# Patient Record
Sex: FEMALE | Race: WHITE | URBAN - METROPOLITAN AREA
[De-identification: names, ages, dates, MRNs, and addresses within clinical notes are randomized per-mention and may not be internally consistent; named-entity substitution may affect disease eponyms.]

---

## 2017-06-23 ENCOUNTER — TELEPHONE (OUTPATIENT)
Dept: BARIATRICS/WEIGHT MGMT | Age: 57
End: 2017-06-23

## 2017-07-14 ENCOUNTER — OFFICE VISIT (OUTPATIENT)
Dept: BARIATRICS/WEIGHT MGMT | Age: 57
End: 2017-07-14
Payer: MEDICARE

## 2017-07-14 VITALS
WEIGHT: 281 LBS | SYSTOLIC BLOOD PRESSURE: 117 MMHG | BODY MASS INDEX: 42.59 KG/M2 | HEIGHT: 68 IN | DIASTOLIC BLOOD PRESSURE: 70 MMHG | HEART RATE: 64 BPM

## 2017-07-14 DIAGNOSIS — I10 ESSENTIAL HYPERTENSION: Primary | ICD-10-CM

## 2017-07-14 DIAGNOSIS — I47.1 SVT (SUPRAVENTRICULAR TACHYCARDIA) (HCC): ICD-10-CM

## 2017-07-14 PROBLEM — I47.10 SVT (SUPRAVENTRICULAR TACHYCARDIA) (HCC): Status: ACTIVE | Noted: 2017-07-14

## 2017-07-14 PROCEDURE — 3017F COLORECTAL CA SCREEN DOC REV: CPT | Performed by: SURGERY

## 2017-07-14 PROCEDURE — G8419 CALC BMI OUT NRM PARAM NOF/U: HCPCS | Performed by: SURGERY

## 2017-07-14 PROCEDURE — 99204 OFFICE O/P NEW MOD 45 MIN: CPT | Performed by: SURGERY

## 2017-07-14 PROCEDURE — G8427 DOCREV CUR MEDS BY ELIG CLIN: HCPCS | Performed by: SURGERY

## 2017-07-14 PROCEDURE — 1036F TOBACCO NON-USER: CPT | Performed by: SURGERY

## 2017-07-14 PROCEDURE — 3014F SCREEN MAMMO DOC REV: CPT | Performed by: SURGERY

## 2017-07-14 RX ORDER — ZOLPIDEM TARTRATE 10 MG/1
TABLET ORAL NIGHTLY PRN
COMMUNITY

## 2017-11-13 ENCOUNTER — TELEPHONE (OUTPATIENT)
Dept: BARIATRICS/WEIGHT MGMT | Age: 57
End: 2017-11-13

## 2018-01-19 NOTE — TELEPHONE ENCOUNTER
Left VMM for pt to schedule RD visit is moving forward with surgery; message also left on 12/22/17 without response; as this is our third attempt to connect with patient without response will cancel orders and notify referring provider

## 2024-05-23 ENCOUNTER — HOSPITAL ENCOUNTER (INPATIENT)
Age: 64
LOS: 5 days | Discharge: HOME | DRG: 304 | End: 2024-05-28
Attending: INTERNAL MEDICINE | Admitting: INTERNAL MEDICINE
Payer: MEDICARE

## 2024-05-23 DIAGNOSIS — R79.89 ELEVATED SERUM CREATININE: ICD-10-CM

## 2024-05-23 DIAGNOSIS — R93.89 ABNORMAL MAGNETIC RESONANCE ANGIOGRAPHY (MRA) OF NECK: Primary | ICD-10-CM

## 2024-05-23 PROBLEM — I16.1 HYPERTENSIVE EMERGENCY: Status: ACTIVE | Noted: 2024-05-23

## 2024-05-23 LAB
CREAT SERPL-MCNC: 1.5 MG/DL (ref 0.5–0.9)
GFR, ESTIMATED: 39 ML/MIN/1.73M2

## 2024-05-23 PROCEDURE — 82565 ASSAY OF CREATININE: CPT

## 2024-05-23 PROCEDURE — 6360000002 HC RX W HCPCS: Performed by: INTERNAL MEDICINE

## 2024-05-23 PROCEDURE — 6370000000 HC RX 637 (ALT 250 FOR IP): Performed by: NURSE PRACTITIONER

## 2024-05-23 PROCEDURE — 6360000002 HC RX W HCPCS: Performed by: NURSE PRACTITIONER

## 2024-05-23 PROCEDURE — 2580000003 HC RX 258: Performed by: INTERNAL MEDICINE

## 2024-05-23 PROCEDURE — 36415 COLL VENOUS BLD VENIPUNCTURE: CPT

## 2024-05-23 PROCEDURE — 2000000000 HC ICU R&B

## 2024-05-23 PROCEDURE — 6370000000 HC RX 637 (ALT 250 FOR IP): Performed by: INTERNAL MEDICINE

## 2024-05-23 PROCEDURE — 99223 1ST HOSP IP/OBS HIGH 75: CPT | Performed by: INTERNAL MEDICINE

## 2024-05-23 RX ORDER — LAMOTRIGINE 100 MG/1
200 TABLET, EXTENDED RELEASE ORAL DAILY
Status: DISCONTINUED | OUTPATIENT
Start: 2024-05-23 | End: 2024-05-28 | Stop reason: HOSPADM

## 2024-05-23 RX ORDER — ONDANSETRON 4 MG/1
4 TABLET, ORALLY DISINTEGRATING ORAL EVERY 8 HOURS PRN
Status: DISCONTINUED | OUTPATIENT
Start: 2024-05-23 | End: 2024-05-28 | Stop reason: HOSPADM

## 2024-05-23 RX ORDER — AMLODIPINE BESYLATE 5 MG/1
5 TABLET ORAL DAILY
Status: DISCONTINUED | OUTPATIENT
Start: 2024-05-23 | End: 2024-05-25

## 2024-05-23 RX ORDER — ENOXAPARIN SODIUM 100 MG/ML
30 INJECTION SUBCUTANEOUS 2 TIMES DAILY
Status: DISCONTINUED | OUTPATIENT
Start: 2024-05-23 | End: 2024-05-28 | Stop reason: HOSPADM

## 2024-05-23 RX ORDER — POTASSIUM CHLORIDE 7.45 MG/ML
10 INJECTION INTRAVENOUS PRN
Status: DISCONTINUED | OUTPATIENT
Start: 2024-05-23 | End: 2024-05-28 | Stop reason: HOSPADM

## 2024-05-23 RX ORDER — ACETAMINOPHEN 650 MG/1
650 SUPPOSITORY RECTAL EVERY 6 HOURS PRN
Status: DISCONTINUED | OUTPATIENT
Start: 2024-05-23 | End: 2024-05-24

## 2024-05-23 RX ORDER — KETOROLAC TROMETHAMINE 30 MG/ML
15 INJECTION, SOLUTION INTRAMUSCULAR; INTRAVENOUS ONCE
Status: COMPLETED | OUTPATIENT
Start: 2024-05-23 | End: 2024-05-23

## 2024-05-23 RX ORDER — POLYETHYLENE GLYCOL 3350 17 G/17G
17 POWDER, FOR SOLUTION ORAL DAILY PRN
Status: DISCONTINUED | OUTPATIENT
Start: 2024-05-23 | End: 2024-05-28 | Stop reason: HOSPADM

## 2024-05-23 RX ORDER — SODIUM CHLORIDE 9 MG/ML
INJECTION, SOLUTION INTRAVENOUS PRN
Status: DISCONTINUED | OUTPATIENT
Start: 2024-05-23 | End: 2024-05-28 | Stop reason: HOSPADM

## 2024-05-23 RX ORDER — MAGNESIUM SULFATE HEPTAHYDRATE 40 MG/ML
2000 INJECTION, SOLUTION INTRAVENOUS PRN
Status: DISCONTINUED | OUTPATIENT
Start: 2024-05-23 | End: 2024-05-28 | Stop reason: HOSPADM

## 2024-05-23 RX ORDER — SODIUM CHLORIDE 0.9 % (FLUSH) 0.9 %
5-40 SYRINGE (ML) INJECTION EVERY 12 HOURS SCHEDULED
Status: DISCONTINUED | OUTPATIENT
Start: 2024-05-23 | End: 2024-05-28 | Stop reason: HOSPADM

## 2024-05-23 RX ORDER — TRAZODONE HYDROCHLORIDE 100 MG/1
100 TABLET ORAL NIGHTLY
Status: DISCONTINUED | OUTPATIENT
Start: 2024-05-23 | End: 2024-05-28 | Stop reason: HOSPADM

## 2024-05-23 RX ORDER — POTASSIUM CHLORIDE 20 MEQ/1
40 TABLET, EXTENDED RELEASE ORAL PRN
Status: DISCONTINUED | OUTPATIENT
Start: 2024-05-23 | End: 2024-05-28 | Stop reason: HOSPADM

## 2024-05-23 RX ORDER — HYDRALAZINE HYDROCHLORIDE 20 MG/ML
10 INJECTION INTRAMUSCULAR; INTRAVENOUS EVERY 6 HOURS PRN
Status: DISCONTINUED | OUTPATIENT
Start: 2024-05-23 | End: 2024-05-24

## 2024-05-23 RX ORDER — ACETAZOLAMIDE 250 MG/1
250 TABLET ORAL DAILY
Status: DISCONTINUED | OUTPATIENT
Start: 2024-05-23 | End: 2024-05-25

## 2024-05-23 RX ORDER — ACETAMINOPHEN 325 MG/1
650 TABLET ORAL EVERY 6 HOURS PRN
Status: DISCONTINUED | OUTPATIENT
Start: 2024-05-23 | End: 2024-05-24

## 2024-05-23 RX ORDER — ONDANSETRON 2 MG/ML
4 INJECTION INTRAMUSCULAR; INTRAVENOUS EVERY 6 HOURS PRN
Status: DISCONTINUED | OUTPATIENT
Start: 2024-05-23 | End: 2024-05-28 | Stop reason: HOSPADM

## 2024-05-23 RX ORDER — METOPROLOL SUCCINATE 100 MG/1
200 TABLET, EXTENDED RELEASE ORAL DAILY
Status: DISCONTINUED | OUTPATIENT
Start: 2024-05-23 | End: 2024-05-28 | Stop reason: HOSPADM

## 2024-05-23 RX ORDER — SODIUM CHLORIDE 0.9 % (FLUSH) 0.9 %
5-40 SYRINGE (ML) INJECTION PRN
Status: DISCONTINUED | OUTPATIENT
Start: 2024-05-23 | End: 2024-05-28 | Stop reason: HOSPADM

## 2024-05-23 RX ADMIN — ACETAZOLAMIDE 250 MG: 250 TABLET ORAL at 20:49

## 2024-05-23 RX ADMIN — AMLODIPINE BESYLATE 5 MG: 5 TABLET ORAL at 20:09

## 2024-05-23 RX ADMIN — TRAZODONE HYDROCHLORIDE 100 MG: 100 TABLET ORAL at 23:32

## 2024-05-23 RX ADMIN — HYDRALAZINE HYDROCHLORIDE 10 MG: 20 INJECTION, SOLUTION INTRAMUSCULAR; INTRAVENOUS at 23:33

## 2024-05-23 RX ADMIN — SODIUM CHLORIDE, PRESERVATIVE FREE 10 ML: 5 INJECTION INTRAVENOUS at 23:35

## 2024-05-23 RX ADMIN — ACETAMINOPHEN 650 MG: 325 TABLET ORAL at 20:05

## 2024-05-23 RX ADMIN — METOPROLOL SUCCINATE 200 MG: 50 TABLET, EXTENDED RELEASE ORAL at 18:45

## 2024-05-23 RX ADMIN — KETOROLAC TROMETHAMINE 15 MG: 30 INJECTION, SOLUTION INTRAMUSCULAR at 23:32

## 2024-05-23 RX ADMIN — ENOXAPARIN SODIUM 30 MG: 100 INJECTION SUBCUTANEOUS at 21:51

## 2024-05-23 RX ADMIN — LAMOTRIGINE 200 MG: 100 TABLET, EXTENDED RELEASE ORAL at 20:06

## 2024-05-23 ASSESSMENT — PAIN DESCRIPTION - DESCRIPTORS
DESCRIPTORS: ACHING
DESCRIPTORS: ACHING

## 2024-05-23 ASSESSMENT — PAIN DESCRIPTION - LOCATION
LOCATION: HEAD

## 2024-05-23 ASSESSMENT — PAIN SCALES - GENERAL
PAINLEVEL_OUTOF10: 7
PAINLEVEL_OUTOF10: 10

## 2024-05-23 NOTE — H&P
[facility]   IN-PATIENT SERVICE   [facility]    HISTORY AND PHYSICAL EXAMINATION            Date:   5/23/2024  Patient name:  Amanda Estrada  Date of admission:  5/23/2024  6:09 PM  MRN:   [id number]  Account:  [id number]  YOB: 1960  PCP:    Albin Brandt MD  Room:   2008/2008-01  Code Status:    Full Code    Chief Complaint:     No chief complaint on file.      History Obtained From:     patient    History of Present Illness:     The patient is a 63 y.o.  Non- / non  female who presents with No chief complaint on file.   and she is admitted to the hospital for the management of    Patient is a 63-year-old female with past medical history of hypertension, SVT presented to the ER at [facility] with headache, patient states that since last 2 days she is having severe headache,  In the ER patient had head CT showed possible idiopathic intracranial hypertension and patient and MRI brain, showed she had a Chiari malformation no ventriculomegaly and chronic white matter changes.  He was also found to be in hypertensive urgency, started on Cardene drip in the ER at [facility] and was sent to [facility] for further management.  Patient had lab work consistent with creatinine of 1.6, patient denies any history of CKD,  Hemoglobin 10.8 microcytic  The patient continues to report headache, denies any chest pain shortness of breath palpitations  No visual loss          Past Medical History:     Past Medical History:   Diagnosis Date   • Acute renal failure (ARF) ([facility])    • Arthritis    • Hypertension    • SVT (supraventricular tachycardia) ([facility])         Past Surgical History:     Past Surgical History:   Procedure Laterality Date   • ABDOMINAL SURGERY          Medications Prior to Admission:     Prior to Admission medications    Medication Sig Start Date End Date Taking? Authorizing Provider   zolpidem (AMBIEN) 10 MG tablet Take by mouth nightly as

## 2024-05-24 ENCOUNTER — APPOINTMENT (OUTPATIENT)
Dept: MRI IMAGING | Age: 64
DRG: 304 | End: 2024-05-24
Attending: INTERNAL MEDICINE
Payer: MEDICARE

## 2024-05-24 ENCOUNTER — APPOINTMENT (OUTPATIENT)
Dept: ULTRASOUND IMAGING | Age: 64
DRG: 304 | End: 2024-05-24
Attending: INTERNAL MEDICINE
Payer: MEDICARE

## 2024-05-24 ENCOUNTER — APPOINTMENT (OUTPATIENT)
Dept: INTERVENTIONAL RADIOLOGY/VASCULAR | Age: 64
DRG: 304 | End: 2024-05-24
Attending: INTERNAL MEDICINE
Payer: MEDICARE

## 2024-05-24 PROBLEM — Z91.148 NONCOMPLIANCE WITH MEDICATION REGIMEN: Status: ACTIVE | Noted: 2024-05-24

## 2024-05-24 PROBLEM — R79.89 ELEVATED SERUM CREATININE: Status: ACTIVE | Noted: 2024-05-24

## 2024-05-24 PROBLEM — R90.89 ABNORMAL FINDING ON MRI OF BRAIN: Status: ACTIVE | Noted: 2024-05-24

## 2024-05-24 LAB
ANION GAP SERPL CALCULATED.3IONS-SCNC: 8 MMOL/L (ref 9–17)
APPEARANCE CSF: COLORLESS
BACTERIA URNS QL MICRO: ABNORMAL
BASOPHILS # BLD: 0.1 K/UL (ref 0–0.2)
BASOPHILS NFR BLD: 1 % (ref 0–2)
BILIRUB UR QL STRIP: NEGATIVE
BUN SERPL-MCNC: 18 MG/DL (ref 8–23)
C GATTII+NEOFOR DNA CSF QL NAA+NON-PROBE: NOT DETECTED
CALCIUM SERPL-MCNC: 9.6 MG/DL (ref 8.6–10.4)
CASTS #/AREA URNS LPF: ABNORMAL /LPF
CHLORIDE SERPL-SCNC: 107 MMOL/L (ref 98–107)
CLARITY UR: CLEAR
CMV DNA CSF QL NAA+NON-PROBE: NOT DETECTED
CO2 SERPL-SCNC: 24 MMOL/L (ref 20–31)
COLOR CSF: CLEAR
COLOR UR: YELLOW
CREAT SERPL-MCNC: 1.5 MG/DL (ref 0.5–0.9)
CREAT UR-MCNC: 62.8 MG/DL (ref 28–217)
CREAT UR-MCNC: 63.4 MG/DL (ref 28–217)
E COLI K1 DNA CSF QL NAA+NON-PROBE: NOT DETECTED
EOSINOPHIL # BLD: 0.3 K/UL (ref 0–0.4)
EOSINOPHIL,URINE: NORMAL
EOSINOPHILS RELATIVE PERCENT: 3 % (ref 0–4)
EPI CELLS #/AREA URNS HPF: ABNORMAL /HPF
ERYTHROCYTE [DISTWIDTH] IN BLOOD BY AUTOMATED COUNT: 16.7 % (ref 11.5–14.9)
EV RNA CSF QL NAA+NON-PROBE: NOT DETECTED
FERRITIN SERPL-MCNC: 262 NG/ML (ref 13–150)
FOLATE SERPL-MCNC: 7.3 NG/ML (ref 4.8–24.2)
GFR, ESTIMATED: 39 ML/MIN/1.73M2
GLUCOSE CSF-MCNC: 64 MG/DL (ref 40–70)
GLUCOSE SERPL-MCNC: 105 MG/DL (ref 70–99)
GLUCOSE UR STRIP-MCNC: NEGATIVE MG/DL
GP B STREP DNA CSF QL NAA+NON-PROBE: NOT DETECTED
HAEM INFLU DNA CSF QL NAA+NON-PROBE: NOT DETECTED
HAPTOGLOB SERPL-MCNC: 151 MG/DL (ref 30–200)
HCT VFR BLD AUTO: 32.5 % (ref 36–46)
HGB BLD-MCNC: 10.5 G/DL (ref 12–16)
HGB UR QL STRIP.AUTO: NEGATIVE
HHV6 DNA CSF QL NAA+NON-PROBE: NOT DETECTED
HSV1 DNA CSF QL NAA+NON-PROBE: NOT DETECTED
HSV2 DNA CSF QL NAA+NON-PROBE: NOT DETECTED
INR PPP: 1.1
IRON SATN MFR SERPL: 22 % (ref 20–55)
IRON SERPL-MCNC: 37 UG/DL (ref 37–145)
KETONES UR STRIP-MCNC: NEGATIVE MG/DL
L MONOCYTOG DNA CSF QL NAA+NON-PROBE: NOT DETECTED
LDH SERPL-CCNC: 157 U/L (ref 135–214)
LEUKOCYTE ESTERASE UR QL STRIP: NEGATIVE
LYMPHOCYTES NFR BLD: 2.3 K/UL (ref 1–4.8)
LYMPHOCYTES RELATIVE PERCENT: 27 % (ref 24–44)
MCH RBC QN AUTO: 27.1 PG (ref 26–34)
MCHC RBC AUTO-ENTMCNC: 32.3 G/DL (ref 31–37)
MCV RBC AUTO: 83.8 FL (ref 80–100)
MONOCYTES NFR BLD: 0.6 K/UL (ref 0.1–1.3)
MONOCYTES NFR BLD: 7 % (ref 1–7)
N MEN DNA CSF QL NAA+NON-PROBE: NOT DETECTED
NEUTROPHILS NFR BLD: 62 % (ref 36–66)
NEUTS SEG NFR BLD: 5.3 K/UL (ref 1.3–9.1)
NITRITE UR QL STRIP: NEGATIVE
NUC CELL # FLD MANUAL: 0 CELLS/UL
PARECHOVIRUS A RNA CSF QL NAA+NON-PROBE: NOT DETECTED
PH UR STRIP: 8 [PH] (ref 5–8)
PLATELET # BLD AUTO: 199 K/UL (ref 150–450)
PMV BLD AUTO: 9.3 FL (ref 6–12)
POTASSIUM SERPL-SCNC: 3.9 MMOL/L (ref 3.7–5.3)
PROT CSF-MCNC: 51 MG/DL (ref 15–45)
PROT UR STRIP-MCNC: ABNORMAL MG/DL
PROTHROMBIN TIME: 14.5 SEC (ref 11.8–14.6)
RBC # BLD AUTO: 3.88 M/UL (ref 4–5.2)
RBC # FLD MANUAL: 0 CELLS/UL
RBC #/AREA URNS HPF: ABNORMAL /HPF
RETICS # AUTO: 0.07 M/UL (ref 0.02–0.1)
RETICS/RBC NFR AUTO: 1.6 % (ref 0.5–2)
S PNEUM DNA CSF QL NAA+NON-PROBE: NOT DETECTED
SODIUM SERPL-SCNC: 139 MMOL/L (ref 135–144)
SODIUM UR-SCNC: 116 MMOL/L
SP GR UR STRIP: 1.01 (ref 1–1.03)
SPECIMEN DESCRIPTION: NORMAL
SPECIMEN VOL CSF: NORMAL ML
TIBC SERPL-MCNC: 170 UG/DL (ref 250–450)
TOTAL PROTEIN, URINE: 18 MG/DL
TUBE # CSF: 3
UNSATURATED IRON BINDING CAPACITY: 133 UG/DL (ref 112–347)
URINE TOTAL PROTEIN CREATININE RATIO: 0.28 (ref 0–0.2)
UROBILINOGEN UR STRIP-ACNC: NORMAL EU/DL (ref 0–1)
UUN UR-MCNC: 292 MG/DL
VIT B12 SERPL-MCNC: 299 PG/ML (ref 232–1245)
VZV DNA CSF QL NAA+NON-PROBE: NOT DETECTED
WBC #/AREA URNS HPF: ABNORMAL /HPF
WBC OTHER # BLD: 8.6 K/UL (ref 3.5–11)
XANTHOCHROMIA CSF QL: NORMAL

## 2024-05-24 PROCEDURE — 70544 MR ANGIOGRAPHY HEAD W/O DYE: CPT

## 2024-05-24 PROCEDURE — 36415 COLL VENOUS BLD VENIPUNCTURE: CPT

## 2024-05-24 PROCEDURE — 87205 SMEAR GRAM STAIN: CPT

## 2024-05-24 PROCEDURE — 62328 DX LMBR SPI PNXR W/FLUOR/CT: CPT

## 2024-05-24 PROCEDURE — 82728 ASSAY OF FERRITIN: CPT

## 2024-05-24 PROCEDURE — 6370000000 HC RX 637 (ALT 250 FOR IP): Performed by: INTERNAL MEDICINE

## 2024-05-24 PROCEDURE — 82607 VITAMIN B-12: CPT

## 2024-05-24 PROCEDURE — 82570 ASSAY OF URINE CREATININE: CPT

## 2024-05-24 PROCEDURE — 83540 ASSAY OF IRON: CPT

## 2024-05-24 PROCEDURE — 80048 BASIC METABOLIC PNL TOTAL CA: CPT

## 2024-05-24 PROCEDURE — 82746 ASSAY OF FOLIC ACID SERUM: CPT

## 2024-05-24 PROCEDURE — 85045 AUTOMATED RETICULOCYTE COUNT: CPT

## 2024-05-24 PROCEDURE — 84540 ASSAY OF URINE/UREA-N: CPT

## 2024-05-24 PROCEDURE — 6360000002 HC RX W HCPCS: Performed by: INTERNAL MEDICINE

## 2024-05-24 PROCEDURE — 99223 1ST HOSP IP/OBS HIGH 75: CPT | Performed by: PSYCHIATRY & NEUROLOGY

## 2024-05-24 PROCEDURE — 83010 ASSAY OF HAPTOGLOBIN QUANT: CPT

## 2024-05-24 PROCEDURE — 70547 MR ANGIOGRAPHY NECK W/O DYE: CPT

## 2024-05-24 PROCEDURE — 84157 ASSAY OF PROTEIN OTHER: CPT

## 2024-05-24 PROCEDURE — 009U3ZX DRAINAGE OF SPINAL CANAL, PERCUTANEOUS APPROACH, DIAGNOSTIC: ICD-10-PCS | Performed by: INTERNAL MEDICINE

## 2024-05-24 PROCEDURE — 70551 MRI BRAIN STEM W/O DYE: CPT

## 2024-05-24 PROCEDURE — 2709999900 IR LUMBAR PUNCTURE FOR DIAGNOSIS

## 2024-05-24 PROCEDURE — 2580000003 HC RX 258: Performed by: INTERNAL MEDICINE

## 2024-05-24 PROCEDURE — 81001 URINALYSIS AUTO W/SCOPE: CPT

## 2024-05-24 PROCEDURE — 6370000000 HC RX 637 (ALT 250 FOR IP): Performed by: NURSE PRACTITIONER

## 2024-05-24 PROCEDURE — 84300 ASSAY OF URINE SODIUM: CPT

## 2024-05-24 PROCEDURE — 85610 PROTHROMBIN TIME: CPT

## 2024-05-24 PROCEDURE — 76775 US EXAM ABDO BACK WALL LIM: CPT

## 2024-05-24 PROCEDURE — 2060000000 HC ICU INTERMEDIATE R&B

## 2024-05-24 PROCEDURE — 87483 CNS DNA AMP PROBE TYPE 12-25: CPT

## 2024-05-24 PROCEDURE — 85025 COMPLETE CBC W/AUTO DIFF WBC: CPT

## 2024-05-24 PROCEDURE — 83615 LACTATE (LD) (LDH) ENZYME: CPT

## 2024-05-24 PROCEDURE — 6370000000 HC RX 637 (ALT 250 FOR IP): Performed by: PSYCHIATRY & NEUROLOGY

## 2024-05-24 PROCEDURE — 6360000002 HC RX W HCPCS: Performed by: PSYCHIATRY & NEUROLOGY

## 2024-05-24 PROCEDURE — 84156 ASSAY OF PROTEIN URINE: CPT

## 2024-05-24 PROCEDURE — 82945 GLUCOSE OTHER FLUID: CPT

## 2024-05-24 PROCEDURE — 99233 SBSQ HOSP IP/OBS HIGH 50: CPT | Performed by: INTERNAL MEDICINE

## 2024-05-24 PROCEDURE — 2580000003 HC RX 258: Performed by: PSYCHIATRY & NEUROLOGY

## 2024-05-24 PROCEDURE — 89050 BODY FLUID CELL COUNT: CPT

## 2024-05-24 PROCEDURE — 83550 IRON BINDING TEST: CPT

## 2024-05-24 RX ORDER — ACETAMINOPHEN 650 MG/1
650 SUPPOSITORY RECTAL EVERY 4 HOURS PRN
Status: DISCONTINUED | OUTPATIENT
Start: 2024-05-24 | End: 2024-05-28 | Stop reason: HOSPADM

## 2024-05-24 RX ORDER — ASPIRIN 81 MG/1
81 TABLET, CHEWABLE ORAL DAILY
Status: DISCONTINUED | OUTPATIENT
Start: 2024-05-24 | End: 2024-05-28 | Stop reason: HOSPADM

## 2024-05-24 RX ORDER — HYDRALAZINE HYDROCHLORIDE 20 MG/ML
10 INJECTION INTRAMUSCULAR; INTRAVENOUS EVERY 4 HOURS PRN
Status: DISCONTINUED | OUTPATIENT
Start: 2024-05-24 | End: 2024-05-28 | Stop reason: HOSPADM

## 2024-05-24 RX ORDER — ACETAMINOPHEN 325 MG/1
650 TABLET ORAL EVERY 4 HOURS PRN
Status: DISCONTINUED | OUTPATIENT
Start: 2024-05-24 | End: 2024-05-28 | Stop reason: HOSPADM

## 2024-05-24 RX ORDER — DIPHENHYDRAMINE HYDROCHLORIDE 50 MG/ML
25 INJECTION INTRAMUSCULAR; INTRAVENOUS EVERY 6 HOURS
Status: DISCONTINUED | OUTPATIENT
Start: 2024-05-24 | End: 2024-05-28 | Stop reason: HOSPADM

## 2024-05-24 RX ADMIN — ENOXAPARIN SODIUM 30 MG: 100 INJECTION SUBCUTANEOUS at 20:57

## 2024-05-24 RX ADMIN — SODIUM CHLORIDE, PRESERVATIVE FREE 10 ML: 5 INJECTION INTRAVENOUS at 09:51

## 2024-05-24 RX ADMIN — METOPROLOL SUCCINATE 200 MG: 50 TABLET, EXTENDED RELEASE ORAL at 09:43

## 2024-05-24 RX ADMIN — WATER 125 MG: 1 INJECTION INTRAMUSCULAR; INTRAVENOUS; SUBCUTANEOUS at 09:43

## 2024-05-24 RX ADMIN — ACETAMINOPHEN 650 MG: 325 TABLET ORAL at 21:03

## 2024-05-24 RX ADMIN — LAMOTRIGINE 200 MG: 100 TABLET, EXTENDED RELEASE ORAL at 09:48

## 2024-05-24 RX ADMIN — SODIUM CHLORIDE, PRESERVATIVE FREE 10 ML: 5 INJECTION INTRAVENOUS at 20:58

## 2024-05-24 RX ADMIN — DIPHENHYDRAMINE HYDROCHLORIDE 25 MG: 50 INJECTION, SOLUTION INTRAMUSCULAR; INTRAVENOUS at 16:10

## 2024-05-24 RX ADMIN — WATER 125 MG: 1 INJECTION INTRAMUSCULAR; INTRAVENOUS; SUBCUTANEOUS at 16:10

## 2024-05-24 RX ADMIN — DIPHENHYDRAMINE HYDROCHLORIDE 25 MG: 50 INJECTION, SOLUTION INTRAMUSCULAR; INTRAVENOUS at 09:43

## 2024-05-24 RX ADMIN — DIPHENHYDRAMINE HYDROCHLORIDE 25 MG: 50 INJECTION, SOLUTION INTRAMUSCULAR; INTRAVENOUS at 20:57

## 2024-05-24 RX ADMIN — ACETAZOLAMIDE 250 MG: 250 TABLET ORAL at 09:44

## 2024-05-24 RX ADMIN — ASPIRIN 81 MG 81 MG: 81 TABLET ORAL at 20:57

## 2024-05-24 ASSESSMENT — PAIN SCALES - GENERAL: PAINLEVEL_OUTOF10: 10

## 2024-05-24 ASSESSMENT — PAIN DESCRIPTION - LOCATION: LOCATION: HEAD

## 2024-05-24 ASSESSMENT — PAIN DESCRIPTION - DESCRIPTORS: DESCRIPTORS: ACHING

## 2024-05-24 NOTE — CARE COORDINATION
Case Management Assessment  Initial Evaluation    Date/Time of Evaluation: 5/24/2024 1:22 PM  Assessment Completed by: Abby Elkins RN    If patient is discharged prior to next notation, then this note serves as note for discharge by case management.    Patient Name: Amanda Estrada                   YOB: 1960  Diagnosis: Hypertensive emergency [I16.1]                   Date / Time: 5/23/2024  6:09 PM    Patient Admission Status: Inpatient   Readmission Risk (Low < 19, Mod (19-27), High > 27): Readmission Risk Score: 8.7    Current PCP: Albin Brandt MD  PCP verified by CM? Yes    Chart Reviewed: Yes      History Provided by: Patient  Patient Orientation: Alert and Oriented    Patient Cognition: Alert    Hospitalization in the last 30 days (Readmission):  No    If yes, Readmission Assessment in  Navigator will be completed.    Advance Directives:      Code Status: Full Code   Patient's Primary Decision Maker is: Legal Next of Kin      Discharge Planning:    Patient lives with:   Type of Home: Homeless  Primary Care Giver: Self  Patient Support Systems include: Children   Current Financial resources: (S) Medicare, Other (Comment) (disability checks, she said she was getting food stamps but not sure how much)  Current community resources: None  Current services prior to admission: None            Current DME:              Type of Home Care services:  None    ADLS  Prior functional level: Independent in ADLs/IADLs  Current functional level:      PT AM-PAC:   /24  OT AM-PAC:   /24    Family can provide assistance at DC: No  Would you like Case Management to discuss the discharge plan with any other family members/significant others, and if so, who? Yes (Daughter)  Plans to Return to Present Housing: Unknown at present  Other Identified Issues/Barriers to RETURNING to current housing: homeless-living in car  Potential Assistance needed at discharge: (S) Other (Comment), Skilled Nursing

## 2024-05-24 NOTE — PLAN OF CARE
Problem: Discharge Planning  Goal: Discharge to home or other facility with appropriate resources  Outcome: Progressing     Problem: Pain  Goal: Verbalizes/displays adequate comfort level or baseline comfort level  Flowsheets (Taken 5/24/2024 0357)  Verbalizes/displays adequate comfort level or baseline comfort level:   Encourage patient to monitor pain and request assistance   Assess pain using appropriate pain scale   Administer analgesics based on type and severity of pain and evaluate response  Note: Tylenol and toradol given this shift for pt c/o headache, pain continues despite medications received     Problem: Neurosensory - Adult  Goal: Achieves stable or improved neurological status  Outcome: Progressing  Flowsheets (Taken 5/24/2024 0357)  Achieves stable or improved neurological status: Assess for and report changes in neurological status  Note: Pt c/o headache \" all over\" intermittent blurred vision     Problem: Neurosensory - Adult  Goal: Absence of seizures  Flowsheets (Taken 5/24/2024 0357)  Absence of seizures:   Monitor for seizure activity.  If seizure occurs, document type and location of movements and any associated apnea   Administer anticonvulsants as ordered  Note: No seizures witnessed, lamictal continued as ordered, MRA ordered for today     Problem: Neurosensory - Adult  Goal: Remains free of injury related to seizures activity  Outcome: Progressing     Problem: Neurosensory - Adult  Goal: Achieves maximal functionality and self care  Outcome: Progressing

## 2024-05-24 NOTE — BRIEF OP NOTE
Brief Postoperative Note    Amanda Bowmangreber  YOB: 1960  [id number]    Pre-operative Diagnosis: Headache    Post-operative Diagnosis: Same    Procedure: Fluoro guided LP    Anesthesia: Local    Surgeons/Assistants: Michael    Estimated Blood Loss: less than 50     Complications: None    Specimens: Was Obtained: clear CSF    Findings: Opening pressure of 13 cm of water    Electronically signed by Emily Rodriguez MD on 5/24/2024 at 11:46 AM

## 2024-05-24 NOTE — PLAN OF CARE
Problem: Discharge Planning  Goal: Discharge to home or other facility with appropriate resources  Outcome: Progressing     Problem: Pain  Goal: Verbalizes/displays adequate comfort level or baseline comfort level  Outcome: Progressing     Problem: Neurosensory - Adult  Goal: Achieves stable or improved neurological status  Outcome: Progressing  Goal: Absence of seizures  Outcome: Progressing  Goal: Remains free of injury related to seizures activity  Outcome: Progressing

## 2024-05-25 PROBLEM — R93.89: Status: ACTIVE | Noted: 2024-05-25

## 2024-05-25 LAB
ANION GAP SERPL CALCULATED.3IONS-SCNC: 12 MMOL/L (ref 9–17)
BASOPHILS # BLD: 0 K/UL (ref 0–0.2)
BASOPHILS NFR BLD: 0 % (ref 0–2)
BUN SERPL-MCNC: 25 MG/DL (ref 8–23)
CALCIUM SERPL-MCNC: 10.4 MG/DL (ref 8.6–10.4)
CHLORIDE SERPL-SCNC: 106 MMOL/L (ref 98–107)
CO2 SERPL-SCNC: 19 MMOL/L (ref 20–31)
CREAT SERPL-MCNC: 2 MG/DL (ref 0.5–0.9)
EOSINOPHIL # BLD: 0 K/UL (ref 0–0.4)
EOSINOPHILS RELATIVE PERCENT: 0 % (ref 0–4)
ERYTHROCYTE [DISTWIDTH] IN BLOOD BY AUTOMATED COUNT: 16.5 % (ref 11.5–14.9)
GFR, ESTIMATED: 28 ML/MIN/1.73M2
GLUCOSE SERPL-MCNC: 159 MG/DL (ref 70–99)
HCT VFR BLD AUTO: 35.9 % (ref 36–46)
HGB BLD-MCNC: 11.3 G/DL (ref 12–16)
LYMPHOCYTES NFR BLD: 0.9 K/UL (ref 1–4.8)
LYMPHOCYTES RELATIVE PERCENT: 8 % (ref 24–44)
MAGNESIUM SERPL-MCNC: 2.3 MG/DL (ref 1.6–2.6)
MCH RBC QN AUTO: 26.6 PG (ref 26–34)
MCHC RBC AUTO-ENTMCNC: 31.5 G/DL (ref 31–37)
MCV RBC AUTO: 84.6 FL (ref 80–100)
MONOCYTES NFR BLD: 0.1 K/UL (ref 0.1–1.3)
MONOCYTES NFR BLD: 1 % (ref 1–7)
NEUTROPHILS NFR BLD: 91 % (ref 36–66)
NEUTS SEG NFR BLD: 10 K/UL (ref 1.3–9.1)
PLATELET # BLD AUTO: 217 K/UL (ref 150–450)
PMV BLD AUTO: 9.2 FL (ref 6–12)
POTASSIUM SERPL-SCNC: 4.1 MMOL/L (ref 3.7–5.3)
RBC # BLD AUTO: 4.24 M/UL (ref 4–5.2)
SODIUM SERPL-SCNC: 137 MMOL/L (ref 135–144)
WBC OTHER # BLD: 11.1 K/UL (ref 3.5–11)

## 2024-05-25 PROCEDURE — 2500000003 HC RX 250 WO HCPCS: Performed by: PSYCHIATRY & NEUROLOGY

## 2024-05-25 PROCEDURE — 6360000002 HC RX W HCPCS: Performed by: PSYCHIATRY & NEUROLOGY

## 2024-05-25 PROCEDURE — 6370000000 HC RX 637 (ALT 250 FOR IP): Performed by: NURSE PRACTITIONER

## 2024-05-25 PROCEDURE — 6360000002 HC RX W HCPCS: Performed by: INTERNAL MEDICINE

## 2024-05-25 PROCEDURE — 83735 ASSAY OF MAGNESIUM: CPT

## 2024-05-25 PROCEDURE — 2580000003 HC RX 258: Performed by: INTERNAL MEDICINE

## 2024-05-25 PROCEDURE — 6370000000 HC RX 637 (ALT 250 FOR IP): Performed by: INTERNAL MEDICINE

## 2024-05-25 PROCEDURE — 2580000003 HC RX 258: Performed by: PSYCHIATRY & NEUROLOGY

## 2024-05-25 PROCEDURE — 99233 SBSQ HOSP IP/OBS HIGH 50: CPT | Performed by: PSYCHIATRY & NEUROLOGY

## 2024-05-25 PROCEDURE — 36415 COLL VENOUS BLD VENIPUNCTURE: CPT

## 2024-05-25 PROCEDURE — 80048 BASIC METABOLIC PNL TOTAL CA: CPT

## 2024-05-25 PROCEDURE — 2060000000 HC ICU INTERMEDIATE R&B

## 2024-05-25 PROCEDURE — 6370000000 HC RX 637 (ALT 250 FOR IP): Performed by: PSYCHIATRY & NEUROLOGY

## 2024-05-25 PROCEDURE — 85025 COMPLETE CBC W/AUTO DIFF WBC: CPT

## 2024-05-25 PROCEDURE — 99233 SBSQ HOSP IP/OBS HIGH 50: CPT | Performed by: INTERNAL MEDICINE

## 2024-05-25 RX ORDER — HYDROCODONE BITARTRATE AND ACETAMINOPHEN 5; 325 MG/1; MG/1
1 TABLET ORAL EVERY 6 HOURS PRN
Status: DISCONTINUED | OUTPATIENT
Start: 2024-05-25 | End: 2024-05-26

## 2024-05-25 RX ORDER — AMLODIPINE BESYLATE 10 MG/1
10 TABLET ORAL DAILY
Status: DISCONTINUED | OUTPATIENT
Start: 2024-05-25 | End: 2024-05-28 | Stop reason: HOSPADM

## 2024-05-25 RX ORDER — BUTALBITAL, ACETAMINOPHEN AND CAFFEINE 300; 40; 50 MG/1; MG/1; MG/1
1 CAPSULE ORAL EVERY 4 HOURS PRN
Status: DISCONTINUED | OUTPATIENT
Start: 2024-05-25 | End: 2024-05-25

## 2024-05-25 RX ORDER — BUTALBITAL, ACETAMINOPHEN AND CAFFEINE 300; 40; 50 MG/1; MG/1; MG/1
2 CAPSULE ORAL EVERY 8 HOURS PRN
Status: DISCONTINUED | OUTPATIENT
Start: 2024-05-25 | End: 2024-05-28 | Stop reason: HOSPADM

## 2024-05-25 RX ORDER — FOLIC ACID 1 MG/1
1 TABLET ORAL DAILY
Status: DISCONTINUED | OUTPATIENT
Start: 2024-05-25 | End: 2024-05-28 | Stop reason: HOSPADM

## 2024-05-25 RX ORDER — METOCLOPRAMIDE HYDROCHLORIDE 5 MG/ML
10 INJECTION INTRAMUSCULAR; INTRAVENOUS EVERY 6 HOURS
Status: DISCONTINUED | OUTPATIENT
Start: 2024-05-25 | End: 2024-05-27

## 2024-05-25 RX ORDER — LANOLIN ALCOHOL/MO/W.PET/CERES
1000 CREAM (GRAM) TOPICAL DAILY
Status: DISCONTINUED | OUTPATIENT
Start: 2024-05-25 | End: 2024-05-28 | Stop reason: HOSPADM

## 2024-05-25 RX ADMIN — BUTALBITA,ACETAMINOPHEN AND CAFFEINE 1 CAPSULE: 50; 300; 40 CAPSULE ORAL at 05:14

## 2024-05-25 RX ADMIN — METOCLOPRAMIDE 10 MG: 5 INJECTION, SOLUTION INTRAMUSCULAR; INTRAVENOUS at 15:43

## 2024-05-25 RX ADMIN — SODIUM CHLORIDE 500 MG: 9 INJECTION, SOLUTION INTRAVENOUS at 14:05

## 2024-05-25 RX ADMIN — TRAZODONE HYDROCHLORIDE 100 MG: 100 TABLET ORAL at 21:24

## 2024-05-25 RX ADMIN — WATER 125 MG: 1 INJECTION INTRAMUSCULAR; INTRAVENOUS; SUBCUTANEOUS at 16:47

## 2024-05-25 RX ADMIN — AMLODIPINE BESYLATE 10 MG: 10 TABLET ORAL at 08:45

## 2024-05-25 RX ADMIN — SODIUM CHLORIDE, PRESERVATIVE FREE 10 ML: 5 INJECTION INTRAVENOUS at 21:24

## 2024-05-25 RX ADMIN — SODIUM CHLORIDE, PRESERVATIVE FREE 10 ML: 5 INJECTION INTRAVENOUS at 16:41

## 2024-05-25 RX ADMIN — WATER 125 MG: 1 INJECTION INTRAMUSCULAR; INTRAVENOUS; SUBCUTANEOUS at 01:44

## 2024-05-25 RX ADMIN — ENOXAPARIN SODIUM 30 MG: 100 INJECTION SUBCUTANEOUS at 08:45

## 2024-05-25 RX ADMIN — ENOXAPARIN SODIUM 30 MG: 100 INJECTION SUBCUTANEOUS at 21:24

## 2024-05-25 RX ADMIN — DIPHENHYDRAMINE HYDROCHLORIDE 25 MG: 50 INJECTION, SOLUTION INTRAMUSCULAR; INTRAVENOUS at 01:55

## 2024-05-25 RX ADMIN — ACETAMINOPHEN 650 MG: 325 TABLET ORAL at 01:53

## 2024-05-25 RX ADMIN — METOCLOPRAMIDE 10 MG: 5 INJECTION, SOLUTION INTRAMUSCULAR; INTRAVENOUS at 21:24

## 2024-05-25 RX ADMIN — LAMOTRIGINE 200 MG: 100 TABLET, EXTENDED RELEASE ORAL at 10:39

## 2024-05-25 RX ADMIN — DIPHENHYDRAMINE HYDROCHLORIDE 25 MG: 50 INJECTION, SOLUTION INTRAMUSCULAR; INTRAVENOUS at 08:44

## 2024-05-25 RX ADMIN — FOLIC ACID 1 MG: 1 TABLET ORAL at 08:45

## 2024-05-25 RX ADMIN — WATER 125 MG: 1 INJECTION INTRAMUSCULAR; INTRAVENOUS; SUBCUTANEOUS at 08:46

## 2024-05-25 RX ADMIN — DIPHENHYDRAMINE HYDROCHLORIDE 25 MG: 50 INJECTION, SOLUTION INTRAMUSCULAR; INTRAVENOUS at 21:24

## 2024-05-25 RX ADMIN — CYANOCOBALAMIN TAB 1000 MCG 1000 MCG: 1000 TAB at 08:45

## 2024-05-25 RX ADMIN — DIPHENHYDRAMINE HYDROCHLORIDE 25 MG: 50 INJECTION, SOLUTION INTRAMUSCULAR; INTRAVENOUS at 15:43

## 2024-05-25 RX ADMIN — ASPIRIN 81 MG 81 MG: 81 TABLET ORAL at 08:44

## 2024-05-25 ASSESSMENT — PAIN SCALES - GENERAL
PAINLEVEL_OUTOF10: 7
PAINLEVEL_OUTOF10: 8

## 2024-05-25 ASSESSMENT — PAIN DESCRIPTION - DESCRIPTORS
DESCRIPTORS: ACHING
DESCRIPTORS: ACHING

## 2024-05-25 ASSESSMENT — PAIN DESCRIPTION - LOCATION
LOCATION: HEAD
LOCATION: HEAD

## 2024-05-25 NOTE — PLAN OF CARE
Problem: Discharge Planning  Goal: Discharge to home or other facility with appropriate resources  5/25/2024 0350 by Elba Tavera RN  Outcome: Progressing     Problem: Pain  Goal: Verbalizes/displays adequate comfort level or baseline comfort level  5/25/2024 0350 by Elba Tavera RN  Flowsheets (Taken 5/25/2024 0350)  Verbalizes/displays adequate comfort level or baseline comfort level:   Encourage patient to monitor pain and request assistance   Assess pain using appropriate pain scale   Administer analgesics based on type and severity of pain and evaluate response  Note: Pt medicated with tylenol twice this shift, pt headache continues     Problem: Neurosensory - Adult  Goal: Achieves stable or improved neurological status  5/25/2024 0350 by Elba Tavera RN  Flowsheets (Taken 5/25/2024 0350)  Achieves stable or improved neurological status: Assess for and report changes in neurological status  Note: Neurologically intact except for intermittent blurred vision and persistent headache     Problem: Neurosensory - Adult  Goal: Remains free of injury related to seizures activity  5/25/2024 0350 by Elba Taevra RN  Outcome: Progressing  Flowsheets (Taken 5/25/2024 0350)  Remains free of injury related to seizure activity:   Monitor patient for seizure activity, document and report duration and description of seizure to Licensed Independent Practitioner   If seizure occurs, turn patient to side and suction secretions as needed  Note: No seizures noted     Problem: Neurosensory - Adult  Goal: Achieves maximal functionality and self care  Outcome: Progressing     Problem: Safety - Adult  Goal: Free from fall injury  Outcome: Progressing     Problem: Skin/Tissue Integrity  Goal: Absence of new skin breakdown  Description: 1.  Monitor for areas of redness and/or skin breakdown  2.  Assess vascular access sites hourly  3.  Every 4-6 hours minimum:  Change oxygen saturation probe site  4.  Every 4-6 hours:  If on

## 2024-05-26 LAB
ANION GAP SERPL CALCULATED.3IONS-SCNC: 12 MMOL/L (ref 9–17)
BASOPHILS # BLD: 0 K/UL (ref 0–0.2)
BASOPHILS NFR BLD: 0 % (ref 0–2)
BUN SERPL-MCNC: 34 MG/DL (ref 8–23)
CALCIUM SERPL-MCNC: 9.8 MG/DL (ref 8.6–10.4)
CHLORIDE SERPL-SCNC: 107 MMOL/L (ref 98–107)
CO2 SERPL-SCNC: 20 MMOL/L (ref 20–31)
CREAT SERPL-MCNC: 2 MG/DL (ref 0.5–0.9)
EOSINOPHIL # BLD: 0 K/UL (ref 0–0.4)
EOSINOPHILS RELATIVE PERCENT: 0 % (ref 0–4)
ERYTHROCYTE [DISTWIDTH] IN BLOOD BY AUTOMATED COUNT: 16.6 % (ref 11.5–14.9)
GFR, ESTIMATED: 28 ML/MIN/1.73M2
GLUCOSE SERPL-MCNC: 145 MG/DL (ref 70–99)
HCT VFR BLD AUTO: 34.4 % (ref 36–46)
HGB BLD-MCNC: 10.9 G/DL (ref 12–16)
LYMPHOCYTES NFR BLD: 0.67 K/UL (ref 1–4.8)
LYMPHOCYTES RELATIVE PERCENT: 4 % (ref 24–44)
MAGNESIUM SERPL-MCNC: 2.5 MG/DL (ref 1.6–2.6)
MCH RBC QN AUTO: 26.6 PG (ref 26–34)
MCHC RBC AUTO-ENTMCNC: 31.7 G/DL (ref 31–37)
MCV RBC AUTO: 84 FL (ref 80–100)
MONOCYTES NFR BLD: 0.34 K/UL (ref 0.1–1.3)
MONOCYTES NFR BLD: 2 % (ref 1–7)
MORPHOLOGY: ABNORMAL
MORPHOLOGY: ABNORMAL
NEUTROPHILS NFR BLD: 94 % (ref 36–66)
NEUTS SEG NFR BLD: 15.79 K/UL (ref 1.3–9.1)
PLATELET # BLD AUTO: 226 K/UL (ref 150–450)
PMV BLD AUTO: 9.6 FL (ref 6–12)
POTASSIUM SERPL-SCNC: 4.3 MMOL/L (ref 3.7–5.3)
RBC # BLD AUTO: 4.09 M/UL (ref 4–5.2)
SODIUM SERPL-SCNC: 139 MMOL/L (ref 135–144)
WBC OTHER # BLD: 16.8 K/UL (ref 3.5–11)

## 2024-05-26 PROCEDURE — 6360000002 HC RX W HCPCS: Performed by: INTERNAL MEDICINE

## 2024-05-26 PROCEDURE — 6370000000 HC RX 637 (ALT 250 FOR IP): Performed by: INTERNAL MEDICINE

## 2024-05-26 PROCEDURE — 6360000002 HC RX W HCPCS: Performed by: PSYCHIATRY & NEUROLOGY

## 2024-05-26 PROCEDURE — 80048 BASIC METABOLIC PNL TOTAL CA: CPT

## 2024-05-26 PROCEDURE — 36415 COLL VENOUS BLD VENIPUNCTURE: CPT

## 2024-05-26 PROCEDURE — 6370000000 HC RX 637 (ALT 250 FOR IP): Performed by: PSYCHIATRY & NEUROLOGY

## 2024-05-26 PROCEDURE — 2580000003 HC RX 258: Performed by: PSYCHIATRY & NEUROLOGY

## 2024-05-26 PROCEDURE — 85025 COMPLETE CBC W/AUTO DIFF WBC: CPT

## 2024-05-26 PROCEDURE — 2580000003 HC RX 258: Performed by: INTERNAL MEDICINE

## 2024-05-26 PROCEDURE — 83735 ASSAY OF MAGNESIUM: CPT

## 2024-05-26 PROCEDURE — 2060000000 HC ICU INTERMEDIATE R&B

## 2024-05-26 PROCEDURE — 99233 SBSQ HOSP IP/OBS HIGH 50: CPT | Performed by: INTERNAL MEDICINE

## 2024-05-26 PROCEDURE — 99232 SBSQ HOSP IP/OBS MODERATE 35: CPT | Performed by: PSYCHIATRY & NEUROLOGY

## 2024-05-26 RX ORDER — PANTOPRAZOLE SODIUM 40 MG/1
40 TABLET, DELAYED RELEASE ORAL
Status: DISCONTINUED | OUTPATIENT
Start: 2024-05-26 | End: 2024-05-28 | Stop reason: HOSPADM

## 2024-05-26 RX ADMIN — FOLIC ACID 1 MG: 1 TABLET ORAL at 08:09

## 2024-05-26 RX ADMIN — METOPROLOL SUCCINATE 200 MG: 50 TABLET, EXTENDED RELEASE ORAL at 08:10

## 2024-05-26 RX ADMIN — LAMOTRIGINE 200 MG: 100 TABLET, EXTENDED RELEASE ORAL at 08:10

## 2024-05-26 RX ADMIN — DIPHENHYDRAMINE HYDROCHLORIDE 25 MG: 50 INJECTION, SOLUTION INTRAMUSCULAR; INTRAVENOUS at 02:20

## 2024-05-26 RX ADMIN — METOCLOPRAMIDE 10 MG: 5 INJECTION, SOLUTION INTRAMUSCULAR; INTRAVENOUS at 02:20

## 2024-05-26 RX ADMIN — SODIUM CHLORIDE, PRESERVATIVE FREE 10 ML: 5 INJECTION INTRAVENOUS at 08:12

## 2024-05-26 RX ADMIN — ENOXAPARIN SODIUM 30 MG: 100 INJECTION SUBCUTANEOUS at 08:10

## 2024-05-26 RX ADMIN — DIPHENHYDRAMINE HYDROCHLORIDE 25 MG: 50 INJECTION, SOLUTION INTRAMUSCULAR; INTRAVENOUS at 20:17

## 2024-05-26 RX ADMIN — DIPHENHYDRAMINE HYDROCHLORIDE 25 MG: 50 INJECTION, SOLUTION INTRAMUSCULAR; INTRAVENOUS at 14:21

## 2024-05-26 RX ADMIN — SODIUM CHLORIDE, PRESERVATIVE FREE 10 ML: 5 INJECTION INTRAVENOUS at 20:18

## 2024-05-26 RX ADMIN — WATER 125 MG: 1 INJECTION INTRAMUSCULAR; INTRAVENOUS; SUBCUTANEOUS at 02:20

## 2024-05-26 RX ADMIN — PANTOPRAZOLE SODIUM 40 MG: 40 TABLET, DELAYED RELEASE ORAL at 16:36

## 2024-05-26 RX ADMIN — AMLODIPINE BESYLATE 10 MG: 10 TABLET ORAL at 08:10

## 2024-05-26 RX ADMIN — ASPIRIN 81 MG 81 MG: 81 TABLET ORAL at 08:09

## 2024-05-26 RX ADMIN — METOCLOPRAMIDE 10 MG: 5 INJECTION, SOLUTION INTRAMUSCULAR; INTRAVENOUS at 20:18

## 2024-05-26 RX ADMIN — METOCLOPRAMIDE 10 MG: 5 INJECTION, SOLUTION INTRAMUSCULAR; INTRAVENOUS at 14:21

## 2024-05-26 RX ADMIN — METOCLOPRAMIDE 10 MG: 5 INJECTION, SOLUTION INTRAMUSCULAR; INTRAVENOUS at 08:07

## 2024-05-26 RX ADMIN — CYANOCOBALAMIN TAB 1000 MCG 1000 MCG: 1000 TAB at 08:10

## 2024-05-26 RX ADMIN — ENOXAPARIN SODIUM 30 MG: 100 INJECTION SUBCUTANEOUS at 20:17

## 2024-05-26 RX ADMIN — WATER 125 MG: 1 INJECTION INTRAMUSCULAR; INTRAVENOUS; SUBCUTANEOUS at 16:36

## 2024-05-26 RX ADMIN — BUTALBITA,ACETAMINOPHEN AND CAFFEINE 2 CAPSULE: 50; 300; 40 CAPSULE ORAL at 14:21

## 2024-05-26 RX ADMIN — DIPHENHYDRAMINE HYDROCHLORIDE 25 MG: 50 INJECTION, SOLUTION INTRAMUSCULAR; INTRAVENOUS at 08:06

## 2024-05-26 RX ADMIN — WATER 125 MG: 1 INJECTION INTRAMUSCULAR; INTRAVENOUS; SUBCUTANEOUS at 08:08

## 2024-05-26 NOTE — PLAN OF CARE
Problem: Discharge Planning  Goal: Discharge to home or other facility with appropriate resources  Outcome: Progressing     Problem: Pain  Goal: Verbalizes/displays adequate comfort level or baseline comfort level  Outcome: Progressing     Problem: Neurosensory - Adult  Goal: Achieves stable or improved neurological status  Outcome: Progressing  Goal: Absence of seizures  Outcome: Progressing     Problem: Safety - Adult  Goal: Free from fall injury  Outcome: Progressing     Problem: Skin/Tissue Integrity  Goal: Absence of new skin breakdown  Description: 1.  Monitor for areas of redness and/or skin breakdown  2.  Assess vascular access sites hourly  3.  Every 4-6 hours minimum:  Change oxygen saturation probe site  4.  Every 4-6 hours:  If on nasal continuous positive airway pressure, respiratory therapy assess nares and determine need for appliance change or resting period.  Outcome: Progressing

## 2024-05-26 NOTE — PLAN OF CARE
Problem: Discharge Planning  Goal: Discharge to home or other facility with appropriate resources  5/26/2024 1404 by Jed Griffin, RN  Outcome: Progressing     Problem: Pain  Goal: Verbalizes/displays adequate comfort level or baseline comfort level  5/26/2024 1404 by Jed Griffin, RN  Outcome: Progressing     Problem: Neurosensory - Adult  Goal: Achieves stable or improved neurological status  5/26/2024 1404 by Jed Griffin, RN  Outcome: Progressing

## 2024-05-26 NOTE — CARE COORDINATION
DISCHARGE PLANNING NOTE:    Patient is homeless. Will likely need to discharge to shelter.    Active order for IV Solu-medrol 125mg every 8 hours.    Bilateral carotid artery duplex and renal duplex scans ordered.    Electronically signed by Ainsley Gonzales RN on 5/26/2024 at 11:56 AM

## 2024-05-26 NOTE — CONSULTS
[facility]   IN-PATIENT SERVICE      NEUROLOGY CONSULT  NOTE            Date:   5/24/2024  Patient name:  Amanda Estrada  Date of admission:  5/23/2024  YOB: 1960      Reason for Consult:      Headache, possible IIH    History of Present Illness:     63-year-old female with past medical history of hypertension, arthritis, CKD, SVT, who was transferred from [facility] due to concerns for intractable headache, hypertensive urgency/emergency.  Neurology consulted for further evaluation.  History obtained from patient and chart review.  Patient is a poor historian.    Patient was in court earlier this week, undergoing divorce proceedings with her  when she started getting a gradual onset headache.  She states that the headache was holocephalic, throbbing, with associated photophobia, phonophobia.  She then developed nausea.  Also started having blurry vision.  Due to worsening symptoms, EMS was called and she was transported to [facility].  In the ED, she was noted to be very hypertensive with blood pressures ranging from 180-220.  She was initially started on Cardene.  Her headache improved but did not resolve. She had imaging done (images not available), per reports:    CT brain w/o: 7 mm cerebellar tonsillar ectopia consistent with Chiari I malformation.  Paucity of the cerebral sulci and basilar cisternal effacement raise possibility of idiopathic intracranial hypertension.  Optic nerves and orbits appear unremarkable.    MRI brain w/o: Chiari I malformation.  No evidence of ventriculomegaly.  There are scattered nonspecific foci of signal abnormality in the deep white matter supratentorially.  Changes can be seen in migraine headaches, postinflammatory white matter changes, vasculitis, small vessels ischemic changes and demyelinating disease.  No evidence of intracranial mass, hemorrhage or acute pathology.      She was evaluated by neurology at [facility].  Due to 
  [facility]  Sy Reynolds MD/Javier ALCAZAR AGACNP-BC, NP-C      Karolyn ALCAZAR NP-C     Xiomara ALCAZAR NP-C      Mingo ALCAZAR NP-C     Pulmonary and Critical Care CONSULT NOTE:      DATE OF CONSULT 5/24/2024    REASON FOR CONSULTATION:  Critical care management      PCP Albin Brandt MD     CHIEF COMPLAINT: Headache    HISTORY OF PRESENT ILLNESS:   This is a 63-year-old female who originally presented to [facility] with complaints of gradual onset of headache throbbing with photophobia.  She also had some nausea and blurry vision.  In the emergency room she was profoundly hypertensive and treated for hypertensive encephalopathy.  CT scan imaging showed Chiari type I malformation with some effacement noted concerning for intracranial hypertension.  MRI of the brain showed nonspecific foci of signal within the deep white matter supratentorially concerning for migraine, postinflammatory changes, vasculitis or even demyelinating disease.  Request for transfer for neurologic assessment and potential need for lumbar puncture per neurologist at [facility].  Patient was seen by my colleague while she was there.    Patient did have prior history of headaches.  She was seen by neurology in 2016 for this.    Has not been following with physicians for a few years now.  Stopped taking her blood pressure medications because she ran out and was not seeing a physician.  Other social stressors including divorce proceedings with .    Acute kidney injury also noted superimposed on chronic kidney disease per reports though I have limited records.    She actually has seen Dr. Brandt 10/2023.  I have records being faxed.    ALLERGIES:  Allergies   Allergen Reactions    Morphine        HOME MEDICATIONS:  Medications Prior to Admission: zolpidem (AMBIEN) 10 MG tablet, Take by mouth nightly as needed for 
profile daily.    2.  Accelerated hypertension - rule out secondary etiologies.  Blood pressure control is now adequate.  Plasma renin and aldosterone.  Check serum cortisol level.  Collect 24-hour urine for VMA and catecholamine metabolites.    Prognosis is guarded.    Thank you very much for the courtesy and confidence of this consultation.    Florian Parrish MD FACP  Attending Nephrologist  5/26/2024 4:24 PM

## 2024-05-27 LAB
C3 SERPL-MCNC: 158 MG/DL (ref 90–180)
C4 SERPL-MCNC: 32 MG/DL (ref 10–40)
CK SERPL-CCNC: 21 U/L (ref 26–192)
HCV AB SERPL QL IA: NONREACTIVE
IGA SERPL-MCNC: 342 MG/DL (ref 70–400)
IGG SERPL-MCNC: 1428 MG/DL (ref 700–1600)
IGM SERPL-MCNC: 29 MG/DL (ref 40–230)
URATE SERPL-MCNC: 8 MG/DL (ref 2.4–5.7)

## 2024-05-27 PROCEDURE — 99232 SBSQ HOSP IP/OBS MODERATE 35: CPT | Performed by: INTERNAL MEDICINE

## 2024-05-27 PROCEDURE — 2580000003 HC RX 258: Performed by: PSYCHIATRY & NEUROLOGY

## 2024-05-27 PROCEDURE — 86038 ANTINUCLEAR ANTIBODIES: CPT

## 2024-05-27 PROCEDURE — 84550 ASSAY OF BLOOD/URIC ACID: CPT

## 2024-05-27 PROCEDURE — 82088 ASSAY OF ALDOSTERONE: CPT

## 2024-05-27 PROCEDURE — 86225 DNA ANTIBODY NATIVE: CPT

## 2024-05-27 PROCEDURE — 86021 WBC ANTIBODY IDENTIFICATION: CPT

## 2024-05-27 PROCEDURE — 83516 IMMUNOASSAY NONANTIBODY: CPT

## 2024-05-27 PROCEDURE — 6360000002 HC RX W HCPCS: Performed by: INTERNAL MEDICINE

## 2024-05-27 PROCEDURE — 86160 COMPLEMENT ANTIGEN: CPT

## 2024-05-27 PROCEDURE — 6360000002 HC RX W HCPCS: Performed by: PSYCHIATRY & NEUROLOGY

## 2024-05-27 PROCEDURE — 36415 COLL VENOUS BLD VENIPUNCTURE: CPT

## 2024-05-27 PROCEDURE — 99232 SBSQ HOSP IP/OBS MODERATE 35: CPT | Performed by: PSYCHIATRY & NEUROLOGY

## 2024-05-27 PROCEDURE — 6370000000 HC RX 637 (ALT 250 FOR IP): Performed by: PSYCHIATRY & NEUROLOGY

## 2024-05-27 PROCEDURE — 86803 HEPATITIS C AB TEST: CPT

## 2024-05-27 PROCEDURE — 84244 ASSAY OF RENIN: CPT

## 2024-05-27 PROCEDURE — 82784 ASSAY IGA/IGD/IGG/IGM EACH: CPT

## 2024-05-27 PROCEDURE — 82550 ASSAY OF CK (CPK): CPT

## 2024-05-27 PROCEDURE — 6370000000 HC RX 637 (ALT 250 FOR IP): Performed by: NURSE PRACTITIONER

## 2024-05-27 PROCEDURE — 6370000000 HC RX 637 (ALT 250 FOR IP): Performed by: INTERNAL MEDICINE

## 2024-05-27 PROCEDURE — 2060000000 HC ICU INTERMEDIATE R&B

## 2024-05-27 RX ORDER — METOCLOPRAMIDE HYDROCHLORIDE 5 MG/ML
5 INJECTION INTRAMUSCULAR; INTRAVENOUS EVERY 6 HOURS
Status: DISCONTINUED | OUTPATIENT
Start: 2024-05-27 | End: 2024-05-28 | Stop reason: HOSPADM

## 2024-05-27 RX ADMIN — METOCLOPRAMIDE 10 MG: 5 INJECTION, SOLUTION INTRAMUSCULAR; INTRAVENOUS at 08:50

## 2024-05-27 RX ADMIN — ENOXAPARIN SODIUM 30 MG: 100 INJECTION SUBCUTANEOUS at 21:22

## 2024-05-27 RX ADMIN — WATER 125 MG: 1 INJECTION INTRAMUSCULAR; INTRAVENOUS; SUBCUTANEOUS at 08:50

## 2024-05-27 RX ADMIN — METOCLOPRAMIDE 5 MG: 5 INJECTION, SOLUTION INTRAMUSCULAR; INTRAVENOUS at 21:21

## 2024-05-27 RX ADMIN — ACETAMINOPHEN 650 MG: 325 TABLET ORAL at 21:22

## 2024-05-27 RX ADMIN — WATER 125 MG: 1 INJECTION INTRAMUSCULAR; INTRAVENOUS; SUBCUTANEOUS at 02:53

## 2024-05-27 RX ADMIN — DIPHENHYDRAMINE HYDROCHLORIDE 25 MG: 50 INJECTION, SOLUTION INTRAMUSCULAR; INTRAVENOUS at 21:21

## 2024-05-27 RX ADMIN — ENOXAPARIN SODIUM 30 MG: 100 INJECTION SUBCUTANEOUS at 08:50

## 2024-05-27 RX ADMIN — PANTOPRAZOLE SODIUM 40 MG: 40 TABLET, DELAYED RELEASE ORAL at 06:07

## 2024-05-27 RX ADMIN — METOCLOPRAMIDE 10 MG: 5 INJECTION, SOLUTION INTRAMUSCULAR; INTRAVENOUS at 02:53

## 2024-05-27 RX ADMIN — DIPHENHYDRAMINE HYDROCHLORIDE 25 MG: 50 INJECTION, SOLUTION INTRAMUSCULAR; INTRAVENOUS at 02:52

## 2024-05-27 RX ADMIN — AMLODIPINE BESYLATE 10 MG: 10 TABLET ORAL at 12:01

## 2024-05-27 RX ADMIN — METOCLOPRAMIDE 5 MG: 5 INJECTION, SOLUTION INTRAMUSCULAR; INTRAVENOUS at 15:29

## 2024-05-27 RX ADMIN — TRAZODONE HYDROCHLORIDE 100 MG: 100 TABLET ORAL at 00:55

## 2024-05-27 ASSESSMENT — PAIN - FUNCTIONAL ASSESSMENT
PAIN_FUNCTIONAL_ASSESSMENT: ACTIVITIES ARE NOT PREVENTED
PAIN_FUNCTIONAL_ASSESSMENT: ACTIVITIES ARE NOT PREVENTED

## 2024-05-27 ASSESSMENT — PAIN DESCRIPTION - LOCATION
LOCATION: HEAD
LOCATION: HEAD

## 2024-05-27 ASSESSMENT — PAIN DESCRIPTION - DESCRIPTORS
DESCRIPTORS: ACHING
DESCRIPTORS: ACHING

## 2024-05-27 ASSESSMENT — PAIN SCALES - GENERAL
PAINLEVEL_OUTOF10: 8
PAINLEVEL_OUTOF10: 4

## 2024-05-27 NOTE — FLOWSHEET NOTE
05/27/24 0524   Treatment Team Notification   Reason for Communication Review case   Name of Team Member Notified NEETU Monet NP   Treatment Team Role Advanced Practice Nurse   Method of Communication Secure Message   Response Waiting for response   Notification Time 0525     Pt found to have an empty bottle of 10mg Ambien prescribed to [place] laying on top of purse on side table. Pt has been sleeping, but is arousable  to voice. NEETU Monet NP notified. Will continue to monitor.

## 2024-05-27 NOTE — CARE COORDINATION
DISCHARGE PLANNING NOTE:     Patient is homeless. Will likely need to discharge to shelter, will request LSW provide with list of facilities.      Awaiting bilateral carotid artery duplex and renal duplex scans ordered, likely to be completed 5/28.    Will continue to follow.    Electronically signed by Meghan Hooks RN on 5/27/2024 at 4:30 PM

## 2024-05-27 NOTE — PLAN OF CARE
Problem: Discharge Planning  Goal: Discharge to home or other facility with appropriate resources  5/27/2024 1542 by Jed Griffin, RN  Outcome: Progressing     Problem: Pain  Goal: Verbalizes/displays adequate comfort level or baseline comfort level  5/27/2024 1542 by Jed Griffin, RN  Outcome: Progressing     Problem: Neurosensory - Adult  Goal: Achieves stable or improved neurological status  Outcome: Progressing

## 2024-05-28 ENCOUNTER — APPOINTMENT (OUTPATIENT)
Dept: VASCULAR LAB | Age: 64
DRG: 304 | End: 2024-05-28
Attending: INTERNAL MEDICINE
Payer: MEDICARE

## 2024-05-28 ENCOUNTER — APPOINTMENT (OUTPATIENT)
Dept: VASCULAR LAB | Age: 64
DRG: 304 | End: 2024-05-28
Attending: PSYCHIATRY & NEUROLOGY
Payer: MEDICARE

## 2024-05-28 VITALS
HEART RATE: 59 BPM | SYSTOLIC BLOOD PRESSURE: 138 MMHG | TEMPERATURE: 98.5 F | WEIGHT: 265 LBS | OXYGEN SATURATION: 97 % | HEIGHT: 68 IN | BODY MASS INDEX: 40.16 KG/M2 | RESPIRATION RATE: 16 BRPM | DIASTOLIC BLOOD PRESSURE: 76 MMHG

## 2024-05-28 DIAGNOSIS — R51.9 NEW ONSET HEADACHE: Primary | ICD-10-CM

## 2024-05-28 LAB
ALDOST SERPL-MCNC: 21.4 NG/DL
ALDOSTERONE COMMENT: NORMAL
ANION GAP SERPL CALCULATED.3IONS-SCNC: 8 MMOL/L (ref 9–17)
BUN SERPL-MCNC: 33 MG/DL (ref 8–23)
CALCIUM SERPL-MCNC: 9 MG/DL (ref 8.6–10.4)
CHLORIDE SERPL-SCNC: 108 MMOL/L (ref 98–107)
CO2 SERPL-SCNC: 22 MMOL/L (ref 20–31)
CREAT SERPL-MCNC: 1.9 MG/DL (ref 0.5–0.9)
ECHO BSA: 2.39 M2
ECHO BSA: 2.39 M2
EKG ATRIAL RATE: 57 BPM
EKG P AXIS: 20 DEGREES
EKG P-R INTERVAL: 160 MS
EKG Q-T INTERVAL: 426 MS
EKG QRS DURATION: 106 MS
EKG QTC CALCULATION (BAZETT): 414 MS
EKG R AXIS: -28 DEGREES
EKG T AXIS: 6 DEGREES
EKG VENTRICULAR RATE: 57 BPM
GFR, ESTIMATED: 29 ML/MIN/1.73M2
GLUCOSE SERPL-MCNC: 183 MG/DL (ref 70–99)
POTASSIUM SERPL-SCNC: 3.6 MMOL/L (ref 3.7–5.3)
SODIUM SERPL-SCNC: 138 MMOL/L (ref 135–144)
VAS AORTA DIST AP: 1.73 CM
VAS AORTA DIST PSV: 91.2 CM/S
VAS AORTA DIST TR: 1.8 CM
VAS AORTA MID AP: 1.8 CM
VAS AORTA MID PSV: 71.4 CM/S
VAS AORTA MID TRANS: 1.8 CM
VAS AORTA PROX AP: 2.2 CM
VAS AORTA PROX PSV: 90.3 CM/S
VAS AORTA PROX TR: 2.2 CM
VAS L RENAL ORIG RI: 0.81
VAS LEFT BULB EDV: 9.2 CM/S
VAS LEFT BULB PSV: 32 CM/S
VAS LEFT CCA DIST EDV: 18 CM/S
VAS LEFT CCA DIST PSV: 84.3 CM/S
VAS LEFT CCA MID EDV: 18 CM/S
VAS LEFT CCA MID PSV: 74.6 CM/S
VAS LEFT CCA PROX EDV: 13.2 CM/S
VAS LEFT CCA PROX PSV: 72.4 CM/S
VAS LEFT ECA EDV: 8.8 CM/S
VAS LEFT ECA PSV: 76 CM/S
VAS LEFT ICA DIST EDV: 30.7 CM/S
VAS LEFT ICA DIST PSV: 70.2 CM/S
VAS LEFT ICA MID EDV: 16.2 CM/S
VAS LEFT ICA MID PSV: 51.4 CM/S
VAS LEFT ICA PROX EDV: 13.2 CM/S
VAS LEFT ICA PROX PSV: 42.1 CM/S
VAS LEFT ICA/CCA PSV: 0.94
VAS LEFT KIDNEY LENGTH: 9.79 CM
VAS LEFT RENAL DIST EDV: 25.5 CM/S
VAS LEFT RENAL DIST PSV: 65.4 CM/S
VAS LEFT RENAL DIST RAR: 0.92
VAS LEFT RENAL DIST RI: 0.61
VAS LEFT RENAL MID EDV: 18.6 CM/S
VAS LEFT RENAL MID PSV: 53.7 CM/S
VAS LEFT RENAL MID RAR: 0.75
VAS LEFT RENAL MID RI: 0.65
VAS LEFT RENAL ORIGIN EDV: 13.8 CM/S
VAS LEFT RENAL ORIGIN PSV: 73.1 CM/S
VAS LEFT RENAL ORIGIN RAR: 1.02
VAS LEFT RENAL PROX EDV: 9.5 CM/S
VAS LEFT RENAL PROX PSV: 67.1 CM/S
VAS LEFT RENAL PROX RAR: 0.94
VAS LEFT RENAL PROX RI: 0.86
VAS LEFT RENAL RAR: 1.02
VAS LEFT VERTEBRAL EDV: 17.6 CM/S
VAS LEFT VERTEBRAL PSV: 54.9 CM/S
VAS RIGHT BULB EDV: 13.7 CM/S
VAS RIGHT BULB PSV: 48.5 CM/S
VAS RIGHT CCA DIST EDV: 18 CM/S
VAS RIGHT CCA DIST PSV: 72.1 CM/S
VAS RIGHT CCA MID EDV: 11.8 CM/S
VAS RIGHT CCA MID PSV: 76.4 CM/S
VAS RIGHT CCA PROX EDV: 16.8 CM/S
VAS RIGHT CCA PROX PSV: 97.5 CM/S
VAS RIGHT ECA EDV: 10.6 CM/S
VAS RIGHT ECA PSV: 82.6 CM/S
VAS RIGHT ICA DIST EDV: 19.3 CM/S
VAS RIGHT ICA DIST PSV: 46.6 CM/S
VAS RIGHT ICA MID EDV: 31.7 CM/S
VAS RIGHT ICA MID PSV: 73.3 CM/S
VAS RIGHT ICA PROX EDV: 13 CM/S
VAS RIGHT ICA PROX PSV: 47.8 CM/S
VAS RIGHT ICA/CCA PSV: 0.96
VAS RIGHT KIDNEY LENGTH: 10.78 CM
VAS RIGHT RENAL DIST EDV: 13.8 CM/S
VAS RIGHT RENAL DIST PSV: 52.3 CM/S
VAS RIGHT RENAL DIST RAR: 0.73
VAS RIGHT RENAL DIST RI: 0.74
VAS RIGHT RENAL MID EDV: 19.3 CM/S
VAS RIGHT RENAL MID PSV: 51.6 CM/S
VAS RIGHT RENAL MID RAR: 0.72
VAS RIGHT RENAL MID RI: 0.63
VAS RIGHT RENAL ORIGIN EDV: 24.1 CM/S
VAS RIGHT RENAL ORIGIN PSV: 74 CM/S
VAS RIGHT RENAL ORIGIN RAR: 1.04
VAS RIGHT RENAL ORIGIN RI: 0.67
VAS RIGHT RENAL PROX EDV: 22.4 CM/S
VAS RIGHT RENAL PROX PSV: 68.8 CM/S
VAS RIGHT RENAL PROX RAR: 0.96
VAS RIGHT RENAL PROX RI: 0.67
VAS RIGHT RENAL RAR: 1.04
VAS RIGHT VERTEBRAL EDV: 12.3 CM/S
VAS RIGHT VERTEBRAL PSV: 37.8 CM/S

## 2024-05-28 PROCEDURE — 6360000002 HC RX W HCPCS: Performed by: PSYCHIATRY & NEUROLOGY

## 2024-05-28 PROCEDURE — 93975 VASCULAR STUDY: CPT

## 2024-05-28 PROCEDURE — 6370000000 HC RX 637 (ALT 250 FOR IP): Performed by: NURSE PRACTITIONER

## 2024-05-28 PROCEDURE — 6370000000 HC RX 637 (ALT 250 FOR IP): Performed by: INTERNAL MEDICINE

## 2024-05-28 PROCEDURE — 6360000002 HC RX W HCPCS: Performed by: INTERNAL MEDICINE

## 2024-05-28 PROCEDURE — 93880 EXTRACRANIAL BILAT STUDY: CPT

## 2024-05-28 PROCEDURE — 99239 HOSP IP/OBS DSCHRG MGMT >30: CPT | Performed by: INTERNAL MEDICINE

## 2024-05-28 PROCEDURE — 80048 BASIC METABOLIC PNL TOTAL CA: CPT

## 2024-05-28 PROCEDURE — 36415 COLL VENOUS BLD VENIPUNCTURE: CPT

## 2024-05-28 PROCEDURE — 6370000000 HC RX 637 (ALT 250 FOR IP): Performed by: PSYCHIATRY & NEUROLOGY

## 2024-05-28 PROCEDURE — 2580000003 HC RX 258: Performed by: INTERNAL MEDICINE

## 2024-05-28 PROCEDURE — 99232 SBSQ HOSP IP/OBS MODERATE 35: CPT | Performed by: PSYCHIATRY & NEUROLOGY

## 2024-05-28 RX ORDER — AMLODIPINE BESYLATE 10 MG/1
10 TABLET ORAL DAILY
Qty: 30 TABLET | Refills: 3 | Status: SHIPPED | OUTPATIENT
Start: 2024-05-29

## 2024-05-28 RX ORDER — LOPERAMIDE HYDROCHLORIDE 2 MG/1
2 CAPSULE ORAL 4 TIMES DAILY PRN
Status: DISCONTINUED | OUTPATIENT
Start: 2024-05-28 | End: 2024-05-28 | Stop reason: HOSPADM

## 2024-05-28 RX ORDER — LOPERAMIDE HYDROCHLORIDE 2 MG/1
2 CAPSULE ORAL 4 TIMES DAILY PRN
Qty: 40 CAPSULE | Refills: 0 | Status: SHIPPED | OUTPATIENT
Start: 2024-05-28 | End: 2024-06-07

## 2024-05-28 RX ORDER — FOLIC ACID 1 MG/1
1 TABLET ORAL DAILY
Qty: 30 TABLET | Refills: 3 | Status: SHIPPED | OUTPATIENT
Start: 2024-05-29

## 2024-05-28 RX ORDER — TOPIRAMATE 25 MG/1
TABLET ORAL
Qty: 60 TABLET | Refills: 3 | Status: SHIPPED | OUTPATIENT
Start: 2024-05-28

## 2024-05-28 RX ORDER — ASPIRIN 81 MG/1
81 TABLET, CHEWABLE ORAL DAILY
Qty: 30 TABLET | Refills: 3 | Status: SHIPPED | OUTPATIENT
Start: 2024-05-29

## 2024-05-28 RX ADMIN — SODIUM CHLORIDE, PRESERVATIVE FREE 10 ML: 5 INJECTION INTRAVENOUS at 11:47

## 2024-05-28 RX ADMIN — FOLIC ACID 1 MG: 1 TABLET ORAL at 11:41

## 2024-05-28 RX ADMIN — PANTOPRAZOLE SODIUM 40 MG: 40 TABLET, DELAYED RELEASE ORAL at 05:52

## 2024-05-28 RX ADMIN — DIPHENHYDRAMINE HYDROCHLORIDE 25 MG: 50 INJECTION, SOLUTION INTRAMUSCULAR; INTRAVENOUS at 15:22

## 2024-05-28 RX ADMIN — TRAZODONE HYDROCHLORIDE 100 MG: 100 TABLET ORAL at 00:24

## 2024-05-28 RX ADMIN — LOPERAMIDE HYDROCHLORIDE 2 MG: 2 CAPSULE ORAL at 13:38

## 2024-05-28 RX ADMIN — LAMOTRIGINE 200 MG: 100 TABLET, EXTENDED RELEASE ORAL at 11:44

## 2024-05-28 RX ADMIN — METOCLOPRAMIDE 5 MG: 5 INJECTION, SOLUTION INTRAMUSCULAR; INTRAVENOUS at 15:23

## 2024-05-28 RX ADMIN — DIPHENHYDRAMINE HYDROCHLORIDE 25 MG: 50 INJECTION, SOLUTION INTRAMUSCULAR; INTRAVENOUS at 03:16

## 2024-05-28 RX ADMIN — AMLODIPINE BESYLATE 10 MG: 10 TABLET ORAL at 11:41

## 2024-05-28 RX ADMIN — ENOXAPARIN SODIUM 30 MG: 100 INJECTION SUBCUTANEOUS at 11:40

## 2024-05-28 RX ADMIN — ASPIRIN 81 MG 81 MG: 81 TABLET ORAL at 11:41

## 2024-05-28 RX ADMIN — METOCLOPRAMIDE 5 MG: 5 INJECTION, SOLUTION INTRAMUSCULAR; INTRAVENOUS at 03:17

## 2024-05-28 RX ADMIN — SODIUM CHLORIDE, PRESERVATIVE FREE 10 ML: 5 INJECTION INTRAVENOUS at 00:25

## 2024-05-28 RX ADMIN — CYANOCOBALAMIN TAB 1000 MCG 1000 MCG: 1000 TAB at 11:44

## 2024-05-28 NOTE — PLAN OF CARE
Problem: Discharge Planning  Goal: Discharge to home or other facility with appropriate resources  5/28/2024 0436 by Jed Tidwell, RN  Outcome: Progressing     Problem: Pain  Goal: Verbalizes/displays adequate comfort level or baseline comfort level  5/28/2024 0436 by Jed Tidwell, RN  Outcome: Progressing     Problem: Safety - Adult  Goal: Free from fall injury  5/28/2024 0436 by Jed Tidwell, RN  Outcome: Progressing     Problem: Skin/Tissue Integrity  Goal: Absence of new skin breakdown  Description: 1.  Monitor for areas of redness and/or skin breakdown  2.  Assess vascular access sites hourly  3.  Every 4-6 hours minimum:  Change oxygen saturation probe site  4.  Every 4-6 hours:  If on nasal continuous positive airway pressure, respiratory therapy assess nares and determine need for appliance change or resting period.  5/28/2024 0436 by Jed Tidwell, RN  Outcome: Progressing

## 2024-05-28 NOTE — PROGRESS NOTES
[facility] PULMONARY,CRITICAL CARE & SLEEP   Sy Ryenolds MD/Javier Squires MD/Rafael Villanueva APRN AGAP-BC, NP-C      Xiomara Michele APRN NP-C    Mingo ALCAZAR NP-C                                         Pulmonary Progress Note    Patient - Amanda SANCHEZ Jacksongracia   Age - 63 y.o.   - 1960  MRN - [id number]  [id number]  Date of Admission - 2024  6:09 PM    Consulting Service/Physician:       Primary Care Physician: Albin Brandt MD    SUBJECTIVE:     Chief Complaint: headache, hypertensive emergency     Subjective:    Patient resting in bed   Continues to complain of headache despite BP being under better control   Denies any dyspnea, cough, wheeze, hemoptysis      VITALS  /77   Pulse 54   Temp 98.5 °F (36.9 °C)   Resp 16   Ht 1.715 m (5' 7.5\")   Wt 120.3 kg (265 lb 3.4 oz)   SpO2 96%   BMI 40.93 kg/m²   Wt Readings from Last 3 Encounters:   24 120.3 kg (265 lb 3.4 oz)   17 127.5 kg (281 lb)   16 132.3 kg (291 lb 9.6 oz)     I/O (24 Hours)  No intake or output data in the 24 hours ending 24 1406  Ventilator:      Exam:   Physical Exam   Constitutional:  Oriented to person, place, and time. Appears well-developed and well-nourished.   HENT: Unremarkable  Head: Normocephalic and atraumatic.   Eyes: EOM are normal. Pupils are equal, round, and reactive to light.   Neck: Neck supple.   Cardiovascular:  Regular rate and rhythm.  Normal heart tones.  No JVD.    Pulmonary/Chest: Effort normal and breath sounds normal.   Abdominal: Soft. Bowel sounds are normal. There is no tenderness.   Musculoskeletal: Normal range of motion. She exhibits no edema or tenderness.   Neurological:patient is alert and oriented to person, place, and time.   Skin: Skin is warm and dry. No rash noted.   Extremities: No edema or discoloration  Infusions:      sodium chloride       Meds:     Current Facility-Administered Medications:     
    Department of Internal Medicine  Nephrology Leonardo Morrison MD   Consult Note    Reason for consultation: Management of acute kidney injury and accelerated hypertension.    Consulting physician: Quinton Fowler MD.    Subjective/interval history.  Patient seen and examined, complains of headache no vision changes.  Blood pressure well-controlled serum creatinine slightly trending up to 2.0 mg/dL patient baseline serum creatinine is between 1.5 to 1.7 mg/dL  No labs today  Good urine    History of present illness: This is a 63 y.o. female with a significant past medical history of  osteoarthritis, systemic hypertension and chronic kidney disease stage IIIb [baseline serum creatinine 1.7 mg/dL on 11/12/2016], who presented  to [facility] from the [facility] with progressively worsening 2-day history of headache and 1 day history of photophobia and phonophobia.  Systolic blood pressure was up to 220 mmHg and she was started on Cardene drip and transferred to [facility].  CT scan of the brain without contrast showed 7 mm cerebellar tonsillar ectopia consistent with cherry 1 malformation.  MRI of the brain confirmed the same.  She subsequently underwent lumbar puncture with removal of 50 mL of fluid and opening pressure to 13 cm of water.  Blood pressure improved and she was transferred out of the ICU to the progressive care unit.  Patient was seen and examined by me in the progressive care unit.  Headache is better at this time.    Laboratory studies at presentation were remarkable for serum creatinine 1.5 mg/dL and hence nephrology consultation.  Serum creatinine today is 2.0 mg/dL.  She is nonoliguric.    Morphine    Past Medical History:   Diagnosis Date    Acute renal failure (ARF) ([facility])     Arthritis     Hypertension     SVT (supraventricular tachycardia) ([facility])      Scheduled Meds:   metoclopramide  5 mg IntraVENous Q6H    pantoprazole  40 mg Oral QAM AC    vitamin B-12  1,000 mcg 
    [facility] PULMONARY,CRITICAL CARE & SLEEP   Sy Reynolds MD/Javier CLOUDP-BC, NP-C      Xiomara Michele APRN NP-C     Mingo ALCAZAR NP-C                                          Pulmonary Progress Note    Patient - Amanda SANCHEZ Jacksongracia   Age - 63 y.o.   - 1960  MRN - [id number]  Acct # - [id number]  Date of Admission - 2024  6:09 PM    Consulting Service/Physician:       Primary Care Physician: Albin Brandt MD    SUBJECTIVE:     Chief Complaint: No chief complaint on file.    Subjective:    Amanda is seen sleeping in bed.  She has been afebrile.  She has been on room air.  In the past she has been noncompliant to CPAP.  Stable from pulmonary standpoint    VITALS  /76   Pulse 59   Temp 98.5 °F (36.9 °C) (Oral)   Resp 16   Ht 1.715 m (5' 7.5\")   Wt 120.2 kg (265 lb)   SpO2 97%   BMI 40.89 kg/m²   Wt Readings from Last 3 Encounters:   24 120.2 kg (265 lb)   17 127.5 kg (281 lb)   16 132.3 kg (291 lb 9.6 oz)     I/O (24 Hours)  No intake or output data in the 24 hours ending 24 1412  Ventilator:      Exam:   Physical Exam   Constitutional: Obese female lying in bed sleeping on room air no distress  HENT: Unremarkable  Head: Normocephalic and atraumatic.   Eyes: Eyes closed  Neck: Neck supple.   Cardiovascular:  Regular rate and rhythm.  Normal heart tones.  No JVD.    Pulmonary/Chest: Effort normal   Neurological: Patient is sleeping  Skin: Skin is warm and dry. No rash noted.     Infusions:      sodium chloride       Meds:     Current Facility-Administered Medications:     loperamide (IMODIUM) capsule 2 mg, 2 mg, Oral, 4x Daily PRN, Jose Carranza MD, 2 mg at 24 1338    metoclopramide (REGLAN) injection 5 mg, 5 mg, IntraVENous, Q6H, Christian Mcmillan DO, 5 mg at 24 0317    pantoprazole (PROTONIX) tablet 40 mg, 40 mg, Oral, EDUARDA FRENCH, Deyanira, Christian J, DO, 40 mg at 24 0552    vitamin B-12 
    [facility]   IN-PATIENT SERVICE   [facility]    PROGRESS NOTE            Date:   5/25/2024  Patient name:  Amanda Estrada  Date of admission:  5/23/2024  6:09 PM  MRN:   [id number]  Account:  [id number]  YOB: 1960  PCP:    Albin Brandt MD  Room:   2008/2008-01  Code Status:    Full Code    Chief Complaint:     No chief complaint on file.      History Obtained From:     patient    History of Present Illness:     The patient is a 63 y.o.  Non- / non  female who presents with No chief complaint on file.   and she is admitted to the hospital for the management of    Patient is a 63-year-old female with past medical history of hypertension, SVT presented to the ER at [facility] with headache, patient states that since last 2 days she is having severe headache,  In the ER patient had head CT showed possible idiopathic intracranial hypertension and patient and MRI brain, showed she had a Chiari malformation no ventriculomegaly and chronic white matter changes.  He was also found to be in hypertensive urgency, started on Cardene drip in the ER at [facility] and was sent to [facility] for further management.  Patient had lab work consistent with creatinine of 1.6, patient denies any history of CKD,  Hemoglobin 10.8 microcytic  The patient continues to report headache, denies any chest pain shortness of breath palpitations  No visual loss    5/25  Patient continues to have headache, reports she had some relief with the Fioricet however does not have complete resolution.  Reports blurred vision has improved.  No nausea no vomiting.    Past Medical History:     Past Medical History:   Diagnosis Date    Acute renal failure (ARF) (H[facility])     Arthritis     Hypertension     SVT (supraventricular tachycardia) ([facility])         Past Surgical History:     Past Surgical History:   Procedure Laterality Date    ABDOMINAL SURGERY          Medications Prior to 
   05/24/24 0934   Encounter Summary   Encounter Overview/Reason Initial Encounter   Service Provided For Family  (Pt's daughter)   Referral/Consult From Rounding   Support System Family members   Last Encounter  05/24/24   Complexity of Encounter Low   Assessment/Intervention/Outcome   Assessment Anxious   Intervention Sustaining Presence/Ministry of presence  (Escorted pt's daughter to pt's room from hospital lobby and communicated with pt's rn that pt's daughter would like to speak with pt's rn)   Outcome Comfort;Engaged in conversation;Expressed feelings, needs, and concerns;Receptive       
 from [facility] called up to ICU and informed writer this will have to be an outpatient follow up, and the care coordinator will have to speak with the neurology specialist Tanna at extension 111.   
CLINICAL PHARMACY NOTE: MEDS TO BEDS    Total # of Prescriptions Filled: 5   The following medications were delivered to the patient:  Aspirin 81mg Chewable  Amlodipine 10mg  Vitamin B 12 1000mcg  Loperamide 2mg  Folic Acid 1mg    Additional Documentation:  Delivered medications to patients room   
Dr Mcmillan notified of consult, MRI results, diagnostics ordered for her at [facility]  
Dr. Chase at bedside, new orders placed   
Dr. Mcmillan rounded, new orders received for MRA/MRV, MRI brain, and lumbar puncture   
Dr. Squires notified of new consult   
MRV head results sent to Dr Mcmillan per secure message  
Patient arrived to ICU 2008, attached to monitor VS taken, oriented to room, call light within reach, standard safety measures in place. Patient arrived with 20g PIV in right AC, PIV pulled out.   
Patient educated on discharge instructions which include: medication schedule, reasons for new medications and some side effects and need to follow-up. Patient given new prescriptions during teaching.  Patient verbalizes understanding of discharge and states readiness for discharge.  All belongings were gathered by patient and in patient's possession.  No distress noted at this time.     Current vital signs:  /76   Pulse 59   Temp 98.5 °F (36.9 °C) (Oral)   Resp 16   Ht 1.715 m (5' 7.5\")   Wt 120.2 kg (265 lb)   SpO2 97%   BMI 40.89 kg/m²                MEWS Score: 1      
Pt c/o headache pain unrelieved by Tylenol, B Luz, DOMI notified   
Pt continues to c/o headache pain despite current therapies ordered, DOMI Monet notified, order received  
Pt states toradol not relieving headache pain, NP B Luz notified  
Pulmonary Progress Note  Pulmonary and Critical Care Specialists      Patient - Amanda Estrada,  Age - 63 y.o.    - 1960      Room Number -    N -  [id number]   [facility] # - [id number]  Date of Admission -  2024  6:09 PM        Consulting Service/Physician   Consulting - Quinton Fowler MD  Primary Care Physician - Albin Brandt MD     SUBJECTIVE   Patient answering questions without difficulty  Systolic blood pressure in the 140    OBJECTIVE   VITALS    height is 1.715 m (5' 7.5\") and weight is 120.3 kg (265 lb 3.4 oz). Her axillary temperature is 97.6 °F (36.4 °C). Her blood pressure is 155/80 (abnormal) and her pulse is 67. Her respiration is 20 and oxygen saturation is 100%.     Body mass index is 40.93 kg/m².  Temperature Range: Temp: 97.6 °F (36.4 °C) Temp  Av.7 °F (36.5 °C)  Min: 97.4 °F (36.3 °C)  Max: 98 °F (36.7 °C)  BP Range:  Systolic (24hrs), Av , Min:129 , Max:165     Diastolic (24hrs), Av, Min:64, Max:106    Pulse Range: Pulse  Av.1  Min: 53  Max: 71  Respiration Range: Resp  Av.6  Min: 11  Max: 30  Current Pulse Ox::  SpO2: 100 %  24HR Pulse Ox Range:  SpO2  Av.2 %  Min: 92 %  Max: 100 %  Oxygen Amount and Delivery:      Wt Readings from Last 3 Encounters:   24 120.3 kg (265 lb 3.4 oz)   17 127.5 kg (281 lb)   16 132.3 kg (291 lb 9.6 oz)       I/O (24 Hours)    Intake/Output Summary (Last 24 hours) at 2024 1456  Last data filed at 2024 0300  Gross per 24 hour   Intake 640 ml   Output 700 ml   Net -60 ml       EXAM     General Appearance  Awake, alert, oriented, in no acute distress  HEENT - normocephalic, atraumatic.  Neck - Supple,  trachea midline   Lungs -clear posteriorly no crackles rales or wheeze  Heart Exam:PMI normal. No lifts, heaves, or thrills. RRR. No murmurs, clicks, gallops, or rubs  Abdomen Exam: Abdomen soft, non-tender. BS normal.  Extremity Exam: No signs of cyanosis    MEDS      vitamin 
RN went in to assess patient and found 2 white circular pills on the floor. That didn't match any of her morning/ night time meds. Patient allowing nurse to check her bag for more pill bottles, RN found bottle of empty Ambien, Prilosec, and 10 trazodone not counting the 2 pills on the floor that matched trazodone pills. (Counted these pills with Lyly Medina RN). Patient allowing RN to lock up meds in nurse . Dr. Carranza mad aware that RN was unable to give morning meds d/t patient being lethargy but arousable.     1210  Patient asking for medications back. Patient put meds in purse. Charge nurse notified.   
Writer put page out for Ophthalmology ([facility]) due to consult ordered  for Blurry Vision.   
Daily    amLODIPine  10 mg Oral Daily    diphenhydrAMINE  25 mg IntraVENous Q6H    aspirin  81 mg Oral Daily    lamoTRIgine  200 mg Oral Daily    metoprolol succinate  200 mg Oral Daily    sodium chloride flush  5-40 mL IntraVENous 2 times per day    enoxaparin  30 mg SubCUTAneous BID    traZODone  100 mg Oral Nightly     PRN Meds include: loperamide, butalbital-APAP-caffeine, acetaminophen **OR** acetaminophen, hydrALAZINE, sodium chloride flush, sodium chloride, potassium chloride **OR** potassium alternative oral replacement **OR** potassium chloride, magnesium sulfate, ondansetron **OR** ondansetron, polyethylene glycol    Objective:   /76   Pulse 59   Temp 98.5 °F (36.9 °C) (Oral)   Resp 16   Ht 1.715 m (5' 7.5\")   Wt 120.2 kg (265 lb)   SpO2 97%   BMI 40.89 kg/m²     Blood pressure range: Systolic (24hrs), Av , Min:125 , Max:156   ; Diastolic (24hrs), Av, Min:59, Max:131        NEUROLOGIC EXAMINATION  GENERAL  Appears comfortable and in no distress   HEENT  NC/ AT   cardiovascular  S1 and S2 heard; palpation of pulses: radial pulse    NECK  Supple and no bruits heard   MENTAL STATUS:  Alert, oriented, intact memory, no confusion, normal speech, normal language, no hallucination or delusion   CRANIAL NERVES: II     -      PERRLA, optic discs; clear; posterior segments  Visual fields intact to confrontation  III,IV,VI -  EOMs full, no afferent defect, no MAVIS, no ptosis  V     -     Normal facial sensation   VII    -     Normal facial symmetry  VIII   -     Intact hearing   IX,X -     Symmetrical palate  XI    -     Symmetrical shoulder shrug  XII   -     Midline tongue, no atrophy    MOTOR FUNCTION:  significant for good strength of grade 5/5 in bilateral proximal and distal muscle groups of both upper and lower extremities with normal bulk, normal tone and no involuntary movements, no tremor   SENSORY FUNCTION:  Normal touch, normal pin, normal vibration, normal proprioception   CEREBELLAR 
Medical History:   Diagnosis Date    Acute renal failure (ARF) (HCC)     Arthritis     Hypertension     SVT (supraventricular tachycardia) (HCC)         Past Surgical History:     Past Surgical History:   Procedure Laterality Date    ABDOMINAL SURGERY          Medications during admission:      vitamin B-12  1,000 mcg Oral Daily    folic acid  1 mg Oral Daily    amLODIPine  10 mg Oral Daily    valproate (DEPACON) 500 mg in sodium chloride 0.9 % 100 mL IVPB  500 mg IntraVENous Once    metoclopramide  10 mg IntraVENous Q6H    methylPREDNISolone  125 mg IntraVENous Q8H    diphenhydrAMINE  25 mg IntraVENous Q6H    aspirin  81 mg Oral Daily    lamoTRIgine  200 mg Oral Daily    metoprolol succinate  200 mg Oral Daily    sodium chloride flush  5-40 mL IntraVENous 2 times per day    enoxaparin  30 mg SubCUTAneous BID    traZODone  100 mg Oral Nightly         Physical Exam:   BP (!) 129/106   Pulse 60   Temp 97.6 °F (36.4 °C) (Axillary)   Resp 19   Ht 1.715 m (5' 7.5\")   Wt 120.3 kg (265 lb 3.4 oz)   SpO2 100%   BMI 40.93 kg/m²   Temp (24hrs), Av.8 °F (36.6 °C), Min:97.6 °F (36.4 °C), Max:98 °F (36.7 °C)          Neurological examination:     Mental status    Alert and oriented x 3; following all commands; speech is fluent, no dysarthria, aphasia.       Cranial nerves    II - visual fields intact to confrontation; pupils reactive (5 mm OU), visual acuity 20/20 OU  III, IV, VI - extraocular muscles intact; no MAVIS; no nystagmus; no ptosis   V - normal facial sensation                                                               VII - normal facial symmetry                                                             VIII - intact hearing                                                                             IX, X - symmetrical palate elevation                                               XI - symmetrical shoulder shrug                                                       XII - midline tongue without atrophy 
                                     XI - symmetrical shoulder shrug                                                       XII - midline tongue without atrophy or fasciculation      Motor function  Strength: 5/5 RUE, 5/5 RLE, 5/5 LUE, 5/5  LLE  Normal bulk and tone.   No tremors                       Sensory function Intact to touch throughout      Cerebellar Intact finger-nose-finger testing.      Reflex function 1/4 symmetric throughout . Downgoing plantar response bilaterally. (-)Monge's sign bilaterally    Gait                  Deferred              Diagnostics:      Laboratory Testing:  CBC:   Recent Labs     05/25/24  0436 05/26/24  0509   WBC 11.1* 16.8*   HGB 11.3* 10.9*    226     BMP:    Recent Labs     05/25/24  0436 05/26/24  0509    139   K 4.1 4.3    107   CO2 19* 20   BUN 25* 34*   CREATININE 2.0* 2.0*   GLUCOSE 159* 145*         Lab Results   Component Value Date    INR 1.1 05/24/2024    [id number] 299 05/24/2024    MG 2.5 05/26/2024       Imaging/Diagnostics:      Results for orders placed during the hospital encounter of 05/23/24    MRA HEAD WO CONTRAST (Preliminary)  This result has not been signed. Information might be incomplete.    Narrative  EXAMINATION:  MRA OF THE NECK WITHOUT CONTRAST; MRA OF THE HEAD WITHOUT CONTRAST; MRV OF  THE HEAD WITHOUT CONTRAST 5/24/2024 3:01 pm    TECHNIQUE:  Multiplanar multisequence MRA of the neck was performed without the  administration of intravenous contrast. Stenosis of the internal carotid  arteries measured using NASCET criteria.; MRA of the head was performed  utilizing time-of-flight imaging with MIP images. No intravenous contrast was  administered.; Multiplanar multisequence MRV of the head was performed  without the administration of intravenous contrast.    COMPARISON:  Brain MRI 05/24/2024    HISTORY:  ORDERING SYSTEM PROVIDED HISTORY: intractable headache  TECHNOLOGIST PROVIDED HISTORY:  intractable headache  Reason for Exam: 
Accelerated hypertension - rule out secondary etiologies.  Blood pressure control is now adequate.  Workup for secondary hypertension in progress  Plasma renin and aldosterone pending  Check serum cortisol level   Collect 24-hour urine for VMA and catecholamine metabolites pending  Renal artery Dopplers pending    Prognosis is guarded.    Thank you very much for the courtesy and confidence of this consultation.    Leonardo Morrison MD   Attending Nephrologist  5/27/2024 3:47 PM            
Complement C4 32 10 - 40 mg/dL   CK    Collection Time: 05/27/24  5:32 AM   Result Value Ref Range    Total CK 21 (L) 26 - 192 U/L   Hepatitis C Antibody    Collection Time: 05/27/24  5:32 AM   Result Value Ref Range    Hepatitis C Ab NONREACTIVE NONREACTIVE   IMMUNOGLOBULINS PANEL    Collection Time: 05/27/24  5:32 AM   Result Value Ref Range    IgG 1428 700 - 1600 mg/dL    IgA 342 70 - 400 mg/dL    IgM 29 (L) 40 - 230 mg/dL   Uric Acid    Collection Time: 05/27/24  5:32 AM   Result Value Ref Range    Uric Acid 8.0 (H) 2.4 - 5.7 mg/dL       Imaging/Diagnostics:        Assessment :      Primary Problem  Hypertensive emergency    Active Hospital Problems    Diagnosis Date Noted    Abnormal magnetic resonance angiography (MRA) of neck [R93.89] 05/25/2024    Abnormal finding on MRI of brain [R90.89] 05/24/2024    Noncompliance with medication regimen [Z91.148] 05/24/2024    Elevated serum creatinine [R79.89] 05/24/2024    Hypertensive emergency [I16.1] 05/23/2024    Hypertension [I10] 07/14/2017    Intractable headache [R51.9]        Plan:     Patient status Admit as inpatient in the intermediate ICU    Patient is 63-year-old female morbidly obese with BMI of 42 presented with headache and hypertensive emergency  Hypertensive emergency, was started on Cardene drip at outlying facility, and sent here  Blood pressure is currently systolic 160s continues to have headache, on Lopressor at home which has been started we will start amlodipine, hydralazine as needed, if blood pressure consistently high will start Cardene drip  Concern of idiopathic intracranial hypertension Will start acetazolamide, consult and neurology  Chiari malformationn, neurology consulted  CKD likely stage III, last creatinine in 2016 was 1.7, creatinine today 1.6 continue to monitor  Anemia likely anemia of chronic disease hemoglobin 10.8 will check iron studies no active bleeding present  DVT prophylaxis      5/25/2024  Intractable headache, 
aspirin 81 mg, pending carotid  Morbid obesity--- BMI 40.9  ANA noncompliant with follow-up or treatment previously saw Dr. Mora, sleep study at [facility], records being faxed  Non-smoker/no intrinsic lung disease  History of \"arrhythmia\" followed by Dr. Villalobos, per dictated notes at [facility] prior history of SVT  Depression  Chronic kidney disease stage III baseline creatinine 1.5 per [facility] records  Full code  PLAN:   Continue DVT prophylaxis  she continues to remain her off continuous IV antihypertensives  Neurology consulted they believe low suspicion for idiopathic intracranial  There was concern for left ICA thrombus on MRI, she is scheduled to have a carotid ultrasound and was started on aspirin 81 mg daily  We will continue to follow intermittently      Electronically signed by OTTONIEL RUIZ - CNP on 05/26/24   [facility] Pulmonary, Critical Care & Sleep    Available via [employer]    [place] Office:  [address] , [place] [address] [phone]     [place] Office:  [facility] [address], [place] [address] [phone]     This progress note was completed using a voice transcription system. Every effort was made to ensure accuracy. However, inadvertent computerized transcription errors may be present.  
1.7, creatinine today 1.6 continue to monitor  Anemia likely anemia of chronic disease hemoglobin 10.8 will check iron studies no active bleeding present  DVT prophylaxis      5/25/2024  Intractable headache, differential includes secondary to hypertensive urgency/emergency, migrainous, CT of the brain and MRI with chiari 1 malformation and concerns for idiopathic intracranial hypertension on imaging.  Underwent lumbar puncture 4/24/2024, opening pressure of 15 cmH2O, no concerns for infection.  Less likely has idiopathic intracranial hypertension.  Initiated on firocet for headache.  Reports some relief.  MRI/MRA MRV does not show any concern for dural venous sinus thrombosis, look signal in the left internal carotid artery bulb.  Will wait on neurology recommendations.  No obvious pathology noted on the internal carotid arteries, middle cerebral arteries or anterior cerebral arteries.  Motion degraded images, distal vertebral arteries and bibasilar artery are patent.  Mild chronic small vessel ischemic changes, no acute brain parenchymal abnormality.  Patient receiving IV steroids, acetazolamide has been held.  Blurred vision, now improved.  To follow-up with ophthalmology as outpatient  Uncontrolled hypertension/hypertensive urgency.  Uptitrate dosing of amlodipine.  Now blood pressures better controlled.  Elevated creatinine, worsening.  Nephrology consulted, UA urine lites suggestive of intrinsic renal pathology.  Await renal ultrasound.  Normocytic normochromic anemia, no reported bleeding.  No baseline.  B12 levels low normal, will start the patient on oral B12 supplementation.  Iron studies within normal limits.      5/27  Patient, clinically doing better  Headache is improved, no blurring of vision, on IV steroids, Reglan, got 1 dose of valproic acid also  Patient, has white counts likely due to steroids  Has sleep apnea need to has sleep study as outpatient,  Worsening creatinine, nephrology following, 
MD    Please note that this chart was generated using voice recognition Dragon dictation software.  Although every effort was made to ensure the accuracy of this automated transcription, some errors in transcription may have occurred.   
definitive dural venous sinus thrombosis.      Results for orders placed during the hospital encounter of 05/23/24    MRI BRAIN WO CONTRAST (Preliminary)  This result has not been signed. Information might be incomplete.    Narrative  EXAMINATION:  MRI OF THE BRAIN WITHOUT CONTRAST  5/24/2024 2:58 pm    TECHNIQUE:  Multiplanar multisequence MRI of the brain was performed without the  administration of intravenous contrast.    COMPARISON:  None.    HISTORY:  ORDERING SYSTEM PROVIDED HISTORY: headache, concern for IIH, ?chiari  malformation on CT  TECHNOLOGIST PROVIDED HISTORY:  headache, concern for IIH, ?chiari malformation on CT  Reason for Exam: headache, concern for IIH, ?chiari malformation on CT    Initial evaluation.    FINDINGS:  INTRACRANIAL STRUCTURES/VENTRICLES: There is no acute infarct.  The  ventricles and cisternal spaces are normal in size, shape, and configuration  for the age of the patient. There are a few punctate areas of high-signal in  the periventricular white matter and centrum semiovale that are likely  related to chronic small vessel ischemic disease.    There is herniation of the cerebellar tonsils through the foramen magnum  consistent with Chiari malformation.    ORBITS: The visualized portion of the orbits demonstrate no acute abnormality.    SINUSES: There is mild mucoperiosteal thickening of the ethmoid air cells.  The remainder of the sinuses are clear.  There is partial opacification of  the left mastoid air cells.  The right mastoid air cells are clear.    BONES/SOFT TISSUES: The bone marrow signal intensity appears normal. The soft  tissues demonstrate no acute abnormality.    Impression  Chiari 1 malformation.    Mild chronic small vessel ischemic changes.  No acute brain parenchymal  abnormality.                I personally reviewed all of the above medications, clinical laboratory, imaging and other diagnostic tests.           Impression:       63-year-old female with past 
normal, will start the patient on oral B12 supplementation.  Iron studies within normal limits.      5/26  Patient, clinically doing better  Headache is much improved, no blurring of vision, on IV steroids, Reglan, got 1 dose of valproic acid also  Patient, has white counts likely due to steroids  Patient still feels dizzy  Has sleep apnea need to has sleep study as outpatient,  Worsening creatinine, nephrology following, will have ultrasound kidneys  Neurology following there is concern for possible thrombus in ICA on the left side  Patient images were reviewed by neurologist he believes that these findings are due to artifact  Patient will have ultrasound carotids  Diamox is discontinued  Consultations:   IP CONSULT TO PULMONOLOGY  IP CONSULT TO NEUROLOGY  IP CONSULT TO OPHTHALMOLOGY  IP CONSULT TO NEPHROLOGY     Patient is admitted as inpatient status because of co-morbidities listed above, severity of signs and symptoms as outlined, requirement for current medical therapies and most importantly because of direct risk to patient if care not provided in a hospital setting.    Jose Carranza MD  5/26/2024  2:46 PM    Copy sent to Dr. Brandt, Albin SANCHEZ MD    Please note that this chart was generated using voice recognition Dragon dictation software.  Although every effort was made to ensure the accuracy of this automated transcription, some errors in transcription may have occurred.

## 2024-05-28 NOTE — CARE COORDINATION
Writer met with pt to provide shelter list. Pt declines, stating she does not want to discharge to a shelter and is currently staying in her vehicle.    Writer discussed with pt access to other resources. Writer offered food pantry list, pt declines stating she has a few pantry locations that she frequents.    No further questions at this time. Writer informed bedside ALEXEY Valencia pt is not interested in shelter placement at this time.  Electronically signed by MAR Lee on 5/28/2024 at 2:17 PM

## 2024-05-28 NOTE — DISCHARGE INSTR - COC
Continuity of Care Form    Patient Name: Amanda Estrada   :  1960  MRN:  [id number]    Admit date:  2024  Discharge date:  ***    Code Status Order: Full Code   Advance Directives:     Admitting Physician:  Quinton Fowler MD  PCP: Albin Brandt MD    Discharging Nurse: ***  Discharging Hospital Unit/Room#: 2116/2116-01  Discharging Unit Phone Number: ***    Emergency Contact:   Extended Emergency Contact Information  Primary Emergency Contact: [place]  Address: [address] [place] [address]  Home Phone: [phone]  Relation: Spouse    Past Surgical History:  Past Surgical History:   Procedure Laterality Date    ABDOMINAL SURGERY         Immunization History:   Immunization History   Administered Date(s) Administered    COVID-19, PFIZER PURPLE top, DILUTE for use, (age 12 y+), 30mcg/0.3mL 2021, 2021       Active Problems:  Patient Active Problem List   Diagnosis Code    Intractable headache R51.9    Hypertension I10    SVT (supraventricular tachycardia) (HCC) I47.10    Hypertensive emergency I16.1    Abnormal finding on MRI of brain R90.89    Noncompliance with medication regimen Z91.148    Elevated serum creatinine R79.89    Abnormal magnetic resonance angiography (MRA) of neck R93.89       Isolation/Infection:   Isolation            No Isolation          Patient Infection Status       None to display            Nurse Assessment:  Last Vital Signs: /76   Pulse 59   Temp 98.5 °F (36.9 °C) (Oral)   Resp 16   Ht 1.715 m (5' 7.5\")   Wt 120.2 kg (265 lb)   SpO2 97%   BMI 40.89 kg/m²     Last documented pain score (0-10 scale): Pain Level: 4  Last Weight:   Wt Readings from Last 1 Encounters:   24 120.2 kg (265 lb)     Mental Status:  {IP PT MENTAL STATUS:25583}    IV Access:  { AMPARO IV ACCESS:673882700}    Nursing Mobility/ADLs:  Walking   {CHP DME ADLs:564641733}  Transfer  {CHP DME ADLs:502764577}  Bathing  {CHP DME

## 2024-05-29 LAB
ANA SER QL IA: NEGATIVE
DSDNA IGG SER QL IA: 1.2 IU/ML
GBM AB SER-ACNC: <1.9 AU/ML (ref 0–7)
NUCLEAR IGG SER IA-RTO: 0.2 U/ML

## 2024-05-30 ENCOUNTER — APPOINTMENT (OUTPATIENT)
Dept: CT IMAGING | Age: 64
End: 2024-05-30
Attending: INTERNAL MEDICINE
Payer: MEDICARE

## 2024-05-30 ENCOUNTER — HOSPITAL ENCOUNTER (INPATIENT)
Age: 64
LOS: 2 days | Discharge: HOME | End: 2024-06-01
Attending: INTERNAL MEDICINE | Admitting: INTERNAL MEDICINE
Payer: MEDICARE

## 2024-05-30 PROBLEM — R93.0: Status: ACTIVE | Noted: 2024-05-30

## 2024-05-30 PROBLEM — R51.9 HEADACHE: Status: ACTIVE | Noted: 2024-05-30

## 2024-05-30 PROBLEM — G93.5 CHIARI I MALFORMATION (HCC): Status: ACTIVE | Noted: 2024-05-30

## 2024-05-30 LAB
ALBUMIN SERPL-MCNC: 3.2 G/DL (ref 3.5–5.2)
ALP SERPL-CCNC: 68 U/L (ref 35–104)
ALT SERPL-CCNC: 7 U/L (ref 5–33)
ANION GAP SERPL CALCULATED.3IONS-SCNC: 6 MMOL/L (ref 9–17)
AST SERPL-CCNC: 6 U/L
BILIRUB SERPL-MCNC: 0.3 MG/DL (ref 0.3–1.2)
BUN SERPL-MCNC: 22 MG/DL (ref 8–23)
CALCIUM SERPL-MCNC: 9.4 MG/DL (ref 8.6–10.4)
CHLORIDE SERPL-SCNC: 108 MMOL/L (ref 98–107)
CO2 SERPL-SCNC: 27 MMOL/L (ref 20–31)
CREAT SERPL-MCNC: 1.5 MG/DL (ref 0.5–0.9)
GFR, ESTIMATED: 39 ML/MIN/1.73M2
GLUCOSE SERPL-MCNC: 129 MG/DL (ref 70–99)
LIPASE SERPL-CCNC: 193 U/L (ref 13–60)
NEUTROPHIL AB SER QL FC: NEGATIVE
POTASSIUM SERPL-SCNC: 3.7 MMOL/L (ref 3.7–5.3)
PROT SERPL-MCNC: 6.1 G/DL (ref 6.4–8.3)
RENIN COMMENT: NORMAL
RENIN PLAS-CCNC: 0.2 NG/ML/HR
SODIUM SERPL-SCNC: 141 MMOL/L (ref 135–144)

## 2024-05-30 PROCEDURE — 6370000000 HC RX 637 (ALT 250 FOR IP): Performed by: INTERNAL MEDICINE

## 2024-05-30 PROCEDURE — 70498 CT ANGIOGRAPHY NECK: CPT

## 2024-05-30 PROCEDURE — 6370000000 HC RX 637 (ALT 250 FOR IP): Performed by: PSYCHIATRY & NEUROLOGY

## 2024-05-30 PROCEDURE — 99233 SBSQ HOSP IP/OBS HIGH 50: CPT | Performed by: PSYCHIATRY & NEUROLOGY

## 2024-05-30 PROCEDURE — 6360000004 HC RX CONTRAST MEDICATION: Performed by: PSYCHIATRY & NEUROLOGY

## 2024-05-30 PROCEDURE — 2060000000 HC ICU INTERMEDIATE R&B

## 2024-05-30 PROCEDURE — 80053 COMPREHEN METABOLIC PANEL: CPT

## 2024-05-30 PROCEDURE — 2580000003 HC RX 258: Performed by: INTERNAL MEDICINE

## 2024-05-30 PROCEDURE — 83690 ASSAY OF LIPASE: CPT

## 2024-05-30 PROCEDURE — 2580000003 HC RX 258: Performed by: PSYCHIATRY & NEUROLOGY

## 2024-05-30 PROCEDURE — 36415 COLL VENOUS BLD VENIPUNCTURE: CPT

## 2024-05-30 PROCEDURE — 99223 1ST HOSP IP/OBS HIGH 75: CPT | Performed by: INTERNAL MEDICINE

## 2024-05-30 RX ORDER — LANOLIN ALCOHOL/MO/W.PET/CERES
1000 CREAM (GRAM) TOPICAL DAILY
Status: DISCONTINUED | OUTPATIENT
Start: 2024-05-30 | End: 2024-06-01 | Stop reason: HOSPADM

## 2024-05-30 RX ORDER — METOPROLOL SUCCINATE 100 MG/1
200 TABLET, EXTENDED RELEASE ORAL DAILY
Status: DISCONTINUED | OUTPATIENT
Start: 2024-05-30 | End: 2024-06-01 | Stop reason: HOSPADM

## 2024-05-30 RX ORDER — ONDANSETRON 2 MG/ML
4 INJECTION INTRAMUSCULAR; INTRAVENOUS EVERY 6 HOURS PRN
Status: DISCONTINUED | OUTPATIENT
Start: 2024-05-30 | End: 2024-06-01 | Stop reason: HOSPADM

## 2024-05-30 RX ORDER — POTASSIUM CHLORIDE 20 MEQ/1
40 TABLET, EXTENDED RELEASE ORAL PRN
Status: DISCONTINUED | OUTPATIENT
Start: 2024-05-30 | End: 2024-06-01 | Stop reason: HOSPADM

## 2024-05-30 RX ORDER — ONDANSETRON 4 MG/1
4 TABLET, ORALLY DISINTEGRATING ORAL EVERY 8 HOURS PRN
Status: DISCONTINUED | OUTPATIENT
Start: 2024-05-30 | End: 2024-06-01 | Stop reason: HOSPADM

## 2024-05-30 RX ORDER — TOPIRAMATE 25 MG/1
25 TABLET ORAL DAILY
Status: DISCONTINUED | OUTPATIENT
Start: 2024-05-30 | End: 2024-05-30

## 2024-05-30 RX ORDER — POTASSIUM CHLORIDE 7.45 MG/ML
10 INJECTION INTRAVENOUS PRN
Status: DISCONTINUED | OUTPATIENT
Start: 2024-05-30 | End: 2024-06-01 | Stop reason: HOSPADM

## 2024-05-30 RX ORDER — SODIUM CHLORIDE 0.9 % (FLUSH) 0.9 %
10 SYRINGE (ML) INJECTION ONCE
Status: COMPLETED | OUTPATIENT
Start: 2024-05-30 | End: 2024-05-30

## 2024-05-30 RX ORDER — ASPIRIN 81 MG/1
81 TABLET, CHEWABLE ORAL DAILY
Status: DISCONTINUED | OUTPATIENT
Start: 2024-05-30 | End: 2024-06-01 | Stop reason: HOSPADM

## 2024-05-30 RX ORDER — ACETAMINOPHEN 325 MG/1
650 TABLET ORAL EVERY 6 HOURS PRN
Status: DISCONTINUED | OUTPATIENT
Start: 2024-05-30 | End: 2024-06-01 | Stop reason: HOSPADM

## 2024-05-30 RX ORDER — TRIAMTERENE AND HYDROCHLOROTHIAZIDE 37.5; 25 MG/1; MG/1
1 TABLET ORAL EVERY MORNING
Status: DISCONTINUED | OUTPATIENT
Start: 2024-05-30 | End: 2024-06-01 | Stop reason: HOSPADM

## 2024-05-30 RX ORDER — TRAZODONE HYDROCHLORIDE 100 MG/1
100 TABLET ORAL NIGHTLY
Status: DISCONTINUED | OUTPATIENT
Start: 2024-05-30 | End: 2024-06-01 | Stop reason: HOSPADM

## 2024-05-30 RX ORDER — POLYETHYLENE GLYCOL 3350 17 G/17G
17 POWDER, FOR SOLUTION ORAL DAILY PRN
Status: DISCONTINUED | OUTPATIENT
Start: 2024-05-30 | End: 2024-06-01 | Stop reason: HOSPADM

## 2024-05-30 RX ORDER — FOLIC ACID 1 MG/1
1 TABLET ORAL DAILY
Status: DISCONTINUED | OUTPATIENT
Start: 2024-05-30 | End: 2024-06-01 | Stop reason: HOSPADM

## 2024-05-30 RX ORDER — SODIUM CHLORIDE 9 MG/ML
INJECTION, SOLUTION INTRAVENOUS CONTINUOUS
Status: DISCONTINUED | OUTPATIENT
Start: 2024-05-30 | End: 2024-05-31

## 2024-05-30 RX ORDER — AMLODIPINE BESYLATE 10 MG/1
10 TABLET ORAL DAILY
Status: DISCONTINUED | OUTPATIENT
Start: 2024-05-30 | End: 2024-06-01 | Stop reason: HOSPADM

## 2024-05-30 RX ORDER — SODIUM CHLORIDE 0.9 % (FLUSH) 0.9 %
5-40 SYRINGE (ML) INJECTION PRN
Status: DISCONTINUED | OUTPATIENT
Start: 2024-05-30 | End: 2024-06-01 | Stop reason: HOSPADM

## 2024-05-30 RX ORDER — SODIUM CHLORIDE 0.9 % (FLUSH) 0.9 %
5-40 SYRINGE (ML) INJECTION EVERY 12 HOURS SCHEDULED
Status: DISCONTINUED | OUTPATIENT
Start: 2024-05-30 | End: 2024-06-01 | Stop reason: HOSPADM

## 2024-05-30 RX ORDER — BUTALBITAL, ACETAMINOPHEN AND CAFFEINE 300; 40; 50 MG/1; MG/1; MG/1
1 CAPSULE ORAL EVERY 6 HOURS PRN
Status: DISCONTINUED | OUTPATIENT
Start: 2024-05-30 | End: 2024-06-01 | Stop reason: HOSPADM

## 2024-05-30 RX ORDER — ENOXAPARIN SODIUM 100 MG/ML
30 INJECTION SUBCUTANEOUS 2 TIMES DAILY
Status: DISCONTINUED | OUTPATIENT
Start: 2024-05-31 | End: 2024-06-01 | Stop reason: HOSPADM

## 2024-05-30 RX ORDER — ACETAMINOPHEN 650 MG/1
650 SUPPOSITORY RECTAL EVERY 6 HOURS PRN
Status: DISCONTINUED | OUTPATIENT
Start: 2024-05-30 | End: 2024-06-01 | Stop reason: HOSPADM

## 2024-05-30 RX ORDER — 0.9 % SODIUM CHLORIDE 0.9 %
100 INTRAVENOUS SOLUTION INTRAVENOUS ONCE
Status: COMPLETED | OUTPATIENT
Start: 2024-05-30 | End: 2024-05-30

## 2024-05-30 RX ORDER — ENOXAPARIN SODIUM 100 MG/ML
40 INJECTION SUBCUTANEOUS DAILY
Status: DISCONTINUED | OUTPATIENT
Start: 2024-05-30 | End: 2024-05-30

## 2024-05-30 RX ORDER — SODIUM CHLORIDE 9 MG/ML
INJECTION, SOLUTION INTRAVENOUS PRN
Status: DISCONTINUED | OUTPATIENT
Start: 2024-05-30 | End: 2024-06-01 | Stop reason: HOSPADM

## 2024-05-30 RX ORDER — LANOLIN ALCOHOL/MO/W.PET/CERES
3 CREAM (GRAM) TOPICAL NIGHTLY PRN
Status: DISCONTINUED | OUTPATIENT
Start: 2024-05-30 | End: 2024-06-01 | Stop reason: HOSPADM

## 2024-05-30 RX ORDER — TOPIRAMATE 25 MG/1
25 TABLET ORAL 2 TIMES DAILY
Status: DISCONTINUED | OUTPATIENT
Start: 2024-05-30 | End: 2024-06-01 | Stop reason: HOSPADM

## 2024-05-30 RX ORDER — MAGNESIUM SULFATE HEPTAHYDRATE 40 MG/ML
2000 INJECTION, SOLUTION INTRAVENOUS PRN
Status: DISCONTINUED | OUTPATIENT
Start: 2024-05-30 | End: 2024-06-01 | Stop reason: HOSPADM

## 2024-05-30 RX ORDER — LAMOTRIGINE 100 MG/1
200 TABLET, EXTENDED RELEASE ORAL DAILY
Status: DISCONTINUED | OUTPATIENT
Start: 2024-05-30 | End: 2024-06-01 | Stop reason: HOSPADM

## 2024-05-30 RX ADMIN — Medication 3 MG: at 23:34

## 2024-05-30 RX ADMIN — CYANOCOBALAMIN TAB 1000 MCG 1000 MCG: 1000 TAB at 12:39

## 2024-05-30 RX ADMIN — IOPAMIDOL 75 ML: 755 INJECTION, SOLUTION INTRAVENOUS at 22:23

## 2024-05-30 RX ADMIN — ASPIRIN 81 MG 81 MG: 81 TABLET ORAL at 12:39

## 2024-05-30 RX ADMIN — SODIUM CHLORIDE: 9 INJECTION, SOLUTION INTRAVENOUS at 22:30

## 2024-05-30 RX ADMIN — AMLODIPINE BESYLATE 10 MG: 10 TABLET ORAL at 12:39

## 2024-05-30 RX ADMIN — METOPROLOL SUCCINATE 200 MG: 100 TABLET, EXTENDED RELEASE ORAL at 12:39

## 2024-05-30 RX ADMIN — SODIUM CHLORIDE 100 ML: 9 INJECTION, SOLUTION INTRAVENOUS at 22:24

## 2024-05-30 RX ADMIN — TOPIRAMATE 25 MG: 25 TABLET, FILM COATED ORAL at 21:02

## 2024-05-30 RX ADMIN — ACETAMINOPHEN 650 MG: 325 TABLET ORAL at 15:15

## 2024-05-30 RX ADMIN — ACETAMINOPHEN 650 MG: 325 TABLET ORAL at 21:02

## 2024-05-30 RX ADMIN — SODIUM CHLORIDE, PRESERVATIVE FREE 10 ML: 5 INJECTION INTRAVENOUS at 22:22

## 2024-05-30 RX ADMIN — SODIUM CHLORIDE: 9 INJECTION, SOLUTION INTRAVENOUS at 12:46

## 2024-05-30 RX ADMIN — TOPIRAMATE 25 MG: 25 TABLET, FILM COATED ORAL at 12:39

## 2024-05-30 RX ADMIN — FOLIC ACID 1 MG: 1 TABLET ORAL at 12:39

## 2024-05-30 RX ADMIN — LAMOTRIGINE 200 MG: 100 TABLET, EXTENDED RELEASE ORAL at 12:38

## 2024-05-30 RX ADMIN — TRIAMTERENE AND HYDROCHLOROTHIAZIDE 1 TABLET: 37.5; 25 TABLET ORAL at 12:54

## 2024-05-30 ASSESSMENT — PAIN - FUNCTIONAL ASSESSMENT: PAIN_FUNCTIONAL_ASSESSMENT: PREVENTS OR INTERFERES SOME ACTIVE ACTIVITIES AND ADLS

## 2024-05-30 ASSESSMENT — PAIN DESCRIPTION - DESCRIPTORS
DESCRIPTORS: ACHING
DESCRIPTORS: SORE

## 2024-05-30 ASSESSMENT — PAIN SCALES - GENERAL
PAINLEVEL_OUTOF10: 0
PAINLEVEL_OUTOF10: 9
PAINLEVEL_OUTOF10: 2

## 2024-05-30 ASSESSMENT — PAIN DESCRIPTION - LOCATION
LOCATION: MOUTH
LOCATION: HEAD

## 2024-05-30 ASSESSMENT — PAIN DESCRIPTION - ORIENTATION: ORIENTATION: RIGHT

## 2024-05-30 NOTE — PROGRESS NOTES
Patient arrived from [facility]. Telemetry applied and strip printed. Bed in low locked position with 2 rails up. Patient acclimated to room and call light.

## 2024-05-30 NOTE — PLAN OF CARE
Problem: Discharge Planning  Goal: Discharge to home or other facility with appropriate resources  Outcome: Progressing     Problem: Safety - Adult  Goal: Free from fall injury  Outcome: Progressing     Problem: Pain  Goal: Verbalizes/displays adequate comfort level or baseline comfort level  Outcome: Progressing     Problem: Skin/Tissue Integrity  Goal: Absence of new skin breakdown  Description: 1.  Monitor for areas of redness and/or skin breakdown  2.  Assess vascular access sites hourly  3.  Every 4-6 hours minimum:  Change oxygen saturation probe site  4.  Every 4-6 hours:  If on nasal continuous positive airway pressure, respiratory therapy assess nares and determine need for appliance change or resting period.  Outcome: Progressing     Problem: ABCDS Injury Assessment  Goal: Absence of physical injury  Outcome: Progressing  Flowsheets (Taken 5/30/2024 1159)  Absence of Physical Injury: Implement safety measures based on patient assessment

## 2024-05-30 NOTE — H&P
[facility]   IN-PATIENT SERVICE   [facility]    HISTORY AND PHYSICAL EXAMINATION            Date:   5/30/2024  Patient name:  Amanda Estrada  Date of admission:  5/30/2024 11:27 AM  MRN:   [id number]  Account:  [id number]  YOB: 1960  PCP:    Albin Brandt MD  Room:   [address] [place] [address]  Code Status:    Full Code    Chief Complaint:     No chief complaint on file.      History Obtained From:     patient, electronic medical record    History of Present Illness:     The patient is a 63 y.o.  Non- / non  female who presents with No chief complaint on file.   and she is admitted to the hospital for the management of intractable headache  Patient unfortunately is a very poor historian  Patient has multiple medical problem, which include hypertension, SVT, CKD, migraine headache, cherry 1 malformation  Patient has a prolonged hospital stay at [facility], she presented with hypertensive urgency, patient also have intractable headache, was evaluated by neurologist underwent extensive testing which include MRI brain, MRA/MRV also had lumbar puncture, opening pressure was around 13, initially treated with Diamox for idiopathic intracranial hypertension which was later discontinued after lumbar puncture report  Patient also have ultrasound of carotid  Was also evaluated by nephrologist given history of CKD  Patient presented yesterday to [facility] with intractable headache, she was treated with migraine cocktail, patient had CT head which was negative  Patient denying complaints of abdominal pain, nausea, vomiting, chest pain  Her lipase was around 1300, CT abdomen pelvis results were seen, there is no inflammation of pancreas, pancreatic pseudocyst  Patient was treated valproic acid, Solu-Medrol during previous hospitalization for intractable headache        Past Medical History:     Past Medical History:   Diagnosis Date    Acute renal failure (ARF) (HCC)   family history on file.    Review of Systems:     Positive and Negative as described in HPI.    CONSTITUTIONAL:  negative for fevers, chills, sweats, fatigue, weight loss  HEENT:  negative for vision, hearing changes, runny nose, throat pain  RESPIRATORY:  negative for shortness of breath, cough, congestion, wheezing.  CARDIOVASCULAR:  negative for chest pain, palpitations.  GASTROINTESTINAL:  negative for nausea, vomiting, diarrhea, constipation, change in bowel habits, abdominal pain   GENITOURINARY:  negative for difficulty of urination, burning with urination, frequency   INTEGUMENT:  negative for rash, skin lesions, easy bruising   HEMATOLOGIC/LYMPHATIC:  negative for swelling/edema   ALLERGIC/IMMUNOLOGIC:  negative for urticaria , itching  ENDOCRINE:  negative increase in drinking, increase in urination, hot or cold intolerance  MUSCULOSKELETAL:  negative joint pains, muscle aches, swelling of joints  NEUROLOGICAL: Headache  BEHAVIOR/PSYCH:  negative for depression, anxiety    Physical Exam:   BP (!) 152/89   Pulse 68   Temp 98.4 °F (36.9 °C) (Oral)   Resp 20   Temp (24hrs), Av.4 °F (36.9 °C), Min:98.4 °F (36.9 °C), Max:98.4 °F (36.9 °C)    No results for input(s): \"POCGLU\" in the last 72 hours.  No intake or output data in the 24 hours ending 24 1146    General Appearance:  alert, well appearing, and in no acute distress, central obesity present  Mental status: oriented to person, place, and time with normal affect  Head:  normocephalic, atraumatic.  Eye: no icterus, redness, pupils equal and reactive, extraocular eye movements intact, conjunctiva clear  Ear: normal external ear, no discharge, hearing intact  Nose:  no drainage noted  Mouth: mucous membranes moist  Neck: supple, no carotid bruits, thyroid not palpable  Lungs: Bilateral equal air entry, clear to ausculation, no wheezing, rales or rhonchi, normal effort  Cardiovascular: normal rate, regular rhythm, no murmur, gallop, rub.  Abdomen:

## 2024-05-30 NOTE — DISCHARGE SUMMARY
Date: 5/28/2024    No hemodynamically significant stenosis involving bilateral renal arteries. Right kidney is normal in size. Left kidney is normal in size.   Right renal vein is patent and demonstrates normal phasicity.   Left renal vein is patent and demonstrates normal phasicity.     US RENAL LIMITED    Result Date: 5/24/2024  EXAMINATION: ULTRASOUND OF THE KIDNEYS 5/24/2024 11:44 am COMPARISON: None. HISTORY: ORDERING SYSTEM PROVIDED HISTORY: elevated creatinine TECHNOLOGIST PROVIDED HISTORY: elevated creatinine FINDINGS: The right kidney measures 10.9 cm in length and the left kidney measures 11.4 cm in length. Kidneys demonstrate increased cortical echogenicity.  No hydronephrosis or intrarenal stones.  There is a 3.6 cm cyst within the left kidney.     1. Increased cortical echogenicity of the kidneys which can be seen with medical renal disease. 2. Left renal cyst.     IR LUMBAR PUNCTURE FOR DIAGNOSIS    Result Date: 5/24/2024  EXAMINATION: FLUOROSCOPIC GUIDED LUMBAR PUNCTURE 5/24/2024 11:03 am HISTORY: ORDERING SYSTEM PROVIDED HISTORY: intractable headache, concern for IIH TECHNOLOGIST PROVIDED HISTORY: intractable headache, concern for IIH please measure opening and closing pressure. If OP>20, please remove fluid to decrease <20. THank you. FLUOROSCOPY DOSE AND TYPE: Radiation Exposure Index: Kerma mGy, 12.4 PROCEDURE: :  Emily Rodriguez MD Informed consent was obtained after the risks and benefits of the procedure were discussed with the patient and all questions were answered fully. [place] protocol was observed and a standard timeout was performed. The patient was placed prone on the fluoroscopic table. The lower back was prepped and draped in usual sterile fashion. 1% lidocaine was used as local anesthetic. Under fluoroscopic guidance a 20-gauge spinal needle was advanced to the thecal sac at the L2-L3 level. Upon establishing CSF fluid return, the measured opening pressure was 13 cm of

## 2024-05-30 NOTE — CONSULTS
discharge, ear pain, nosebleed   Eyes Negative for photophobia, pain and discharge   Respiratory Negative for hemoptysis and sputum   Cardiovascular Negative for orthopnea, claudication and PND   Gastrointestinal Negative for abdominal pain, diarrhea, blood in stool   Musculoskeletal Negative for joint pain, negative for myalgia   Skin Negative for rash or itching   Endo/heme/allergies Negative for polydipsia, environmental allergy   Psychiatric Negative for suicidal ideation.  Patient is not anxious           Objective:   BP (!) 152/89   Pulse 68   Temp 98.4 °F (36.9 °C) (Oral)   Resp 20     Blood pressure range: Systolic (24hrs), Av , Min:152 , Max:152   ; Diastolic (24hrs), Av, Min:89, Max:89      Continuous infusions:    sodium chloride      sodium chloride 100 mL/hr at 24 1246       ON EXAMINATION:  GENERAL  Appears comfortable and in no distress   HEENT  NC/ AT   NECK  Supple and no bruits heard   Cardiovascular  S1, S2 heard; radial pulse intact   MENTAL STATUS:  Alert, oriented, intact memory, no confusion, normal speech, normal language, no hallucination or delusion   CRANIAL NERVES: II     -       Pupils reactive b/l visual acuity: 20/30 OU; Fundus exam: intact venous pulsations; Visual fields intact to confrontation  III,IV,VI -  EOMs full, no afferent defect, no                      MAVIS, no ptosis  V     -     Normal facial sensation  VII    -     Normal facial symmetry  VIII   -     Intact hearing  IX,X -     Symmetrical palate  XI    -     Symmetrical shoulder shrug  XII   -     Midline tongue, no atrophy    MOTOR FUNCTION:  Normal bulk, normal tone, normal power;  no involuntary movements, no tremor   SENSORY FUNCTION:  Normal touch, normal pin, normal vibration, normal proprioception   CEREBELLAR FUNCTION:  Intact fine motor control over upper limbs   REFLEX FUNCTION:  Symmetric, no perverted reflex, no Babinski sign   STATION and GAIT  Not tested         Data:    Lab Results:  continue Topamax at 25 mg twice daily for prophylaxis along with Fioricet for abortive therapy.  Chiari I malformation without any signs of cranial nerve palsy or any other neurologic deficits.  MRA head with questionable thrombus; will get CTA neck in further eval.  Chest pain and respiratory distress: As per primary team.  Comorbid conditions include hypertension, CKD, history of SVT.    Care plan is discussed with the patient in detail and she voiced understanding those instructions  Will follow with you. Thank you for consultation.       This note was partially created using voice recognition software and is inherently subject to errors including those of syntax and \"sound alike\" substitutions which may escape proofreading.  In such instances, original meaning may be extrapolated by contextual derivation.  Svetlana Fall MD 5/30/2024 3:32 PM

## 2024-05-31 LAB
ANION GAP SERPL CALCULATED.3IONS-SCNC: 6 MMOL/L (ref 9–17)
BUN SERPL-MCNC: 21 MG/DL (ref 8–23)
CALCIUM SERPL-MCNC: 9.1 MG/DL (ref 8.6–10.4)
CHLORIDE SERPL-SCNC: 107 MMOL/L (ref 98–107)
CO2 SERPL-SCNC: 25 MMOL/L (ref 20–31)
CREAT SERPL-MCNC: 1.6 MG/DL (ref 0.5–0.9)
GFR, ESTIMATED: 36 ML/MIN/1.73M2
GLUCOSE SERPL-MCNC: 96 MG/DL (ref 70–99)
LIPASE SERPL-CCNC: 157 U/L (ref 13–60)
POTASSIUM SERPL-SCNC: 4 MMOL/L (ref 3.7–5.3)
SODIUM SERPL-SCNC: 138 MMOL/L (ref 135–144)

## 2024-05-31 PROCEDURE — 36415 COLL VENOUS BLD VENIPUNCTURE: CPT

## 2024-05-31 PROCEDURE — 6370000000 HC RX 637 (ALT 250 FOR IP): Performed by: INTERNAL MEDICINE

## 2024-05-31 PROCEDURE — 6370000000 HC RX 637 (ALT 250 FOR IP): Performed by: PSYCHIATRY & NEUROLOGY

## 2024-05-31 PROCEDURE — 2060000000 HC ICU INTERMEDIATE R&B

## 2024-05-31 PROCEDURE — 80048 BASIC METABOLIC PNL TOTAL CA: CPT

## 2024-05-31 PROCEDURE — 99232 SBSQ HOSP IP/OBS MODERATE 35: CPT | Performed by: PSYCHIATRY & NEUROLOGY

## 2024-05-31 PROCEDURE — 6360000002 HC RX W HCPCS: Performed by: INTERNAL MEDICINE

## 2024-05-31 PROCEDURE — 2580000003 HC RX 258: Performed by: INTERNAL MEDICINE

## 2024-05-31 PROCEDURE — 99233 SBSQ HOSP IP/OBS HIGH 50: CPT | Performed by: INTERNAL MEDICINE

## 2024-05-31 PROCEDURE — 6370000000 HC RX 637 (ALT 250 FOR IP): Performed by: NURSE PRACTITIONER

## 2024-05-31 PROCEDURE — 83690 ASSAY OF LIPASE: CPT

## 2024-05-31 RX ORDER — HYDRALAZINE HYDROCHLORIDE 25 MG/1
25 TABLET, FILM COATED ORAL EVERY 8 HOURS SCHEDULED
Status: DISCONTINUED | OUTPATIENT
Start: 2024-05-31 | End: 2024-06-01

## 2024-05-31 RX ORDER — HYDROCODONE BITARTRATE AND ACETAMINOPHEN 5; 325 MG/1; MG/1
1 TABLET ORAL ONCE
Status: COMPLETED | OUTPATIENT
Start: 2024-05-31 | End: 2024-05-31

## 2024-05-31 RX ORDER — ZOLPIDEM TARTRATE 5 MG/1
5 TABLET ORAL NIGHTLY PRN
Status: DISCONTINUED | OUTPATIENT
Start: 2024-05-31 | End: 2024-06-01 | Stop reason: HOSPADM

## 2024-05-31 RX ORDER — OXYCODONE HYDROCHLORIDE 5 MG/1
5 TABLET ORAL EVERY 6 HOURS PRN
Status: DISCONTINUED | OUTPATIENT
Start: 2024-05-31 | End: 2024-06-01 | Stop reason: HOSPADM

## 2024-05-31 RX ADMIN — ACETAMINOPHEN 650 MG: 325 TABLET ORAL at 04:01

## 2024-05-31 RX ADMIN — ZOLPIDEM TARTRATE 5 MG: 5 TABLET ORAL at 23:05

## 2024-05-31 RX ADMIN — ENOXAPARIN SODIUM 30 MG: 100 INJECTION SUBCUTANEOUS at 20:48

## 2024-05-31 RX ADMIN — HYDRALAZINE HYDROCHLORIDE 25 MG: 25 TABLET ORAL at 20:48

## 2024-05-31 RX ADMIN — CYANOCOBALAMIN TAB 1000 MCG 1000 MCG: 1000 TAB at 08:48

## 2024-05-31 RX ADMIN — TOPIRAMATE 25 MG: 25 TABLET, FILM COATED ORAL at 08:49

## 2024-05-31 RX ADMIN — ENOXAPARIN SODIUM 30 MG: 100 INJECTION SUBCUTANEOUS at 08:49

## 2024-05-31 RX ADMIN — SODIUM CHLORIDE, PRESERVATIVE FREE 10 ML: 5 INJECTION INTRAVENOUS at 20:48

## 2024-05-31 RX ADMIN — METOPROLOL SUCCINATE 200 MG: 100 TABLET, EXTENDED RELEASE ORAL at 08:48

## 2024-05-31 RX ADMIN — WATER 40 MG: 1 INJECTION INTRAMUSCULAR; INTRAVENOUS; SUBCUTANEOUS at 13:54

## 2024-05-31 RX ADMIN — ACETAMINOPHEN 650 MG: 325 TABLET ORAL at 12:20

## 2024-05-31 RX ADMIN — TRAZODONE HYDROCHLORIDE 100 MG: 100 TABLET ORAL at 23:05

## 2024-05-31 RX ADMIN — BUTALBITA,ACETAMINOPHEN AND CAFFEINE 1 CAPSULE: 50; 300; 40 CAPSULE ORAL at 23:53

## 2024-05-31 RX ADMIN — OXYCODONE HYDROCHLORIDE 5 MG: 5 TABLET ORAL at 18:21

## 2024-05-31 RX ADMIN — AMLODIPINE BESYLATE 10 MG: 10 TABLET ORAL at 08:49

## 2024-05-31 RX ADMIN — HYDROCODONE BITARTRATE AND ACETAMINOPHEN 1 TABLET: 5; 325 TABLET ORAL at 03:07

## 2024-05-31 RX ADMIN — HYDRALAZINE HYDROCHLORIDE 25 MG: 25 TABLET ORAL at 13:54

## 2024-05-31 RX ADMIN — FOLIC ACID 1 MG: 1 TABLET ORAL at 08:49

## 2024-05-31 RX ADMIN — ASPIRIN 81 MG 81 MG: 81 TABLET ORAL at 08:49

## 2024-05-31 RX ADMIN — TOPIRAMATE 25 MG: 25 TABLET, FILM COATED ORAL at 20:48

## 2024-05-31 RX ADMIN — WATER 40 MG: 1 INJECTION INTRAMUSCULAR; INTRAVENOUS; SUBCUTANEOUS at 20:48

## 2024-05-31 RX ADMIN — OXYCODONE HYDROCHLORIDE 5 MG: 5 TABLET ORAL at 08:49

## 2024-05-31 RX ADMIN — LAMOTRIGINE 200 MG: 100 TABLET, EXTENDED RELEASE ORAL at 08:51

## 2024-05-31 RX ADMIN — TRIAMTERENE AND HYDROCHLOROTHIAZIDE 1 TABLET: 37.5; 25 TABLET ORAL at 10:05

## 2024-05-31 RX ADMIN — SODIUM CHLORIDE: 9 INJECTION, SOLUTION INTRAVENOUS at 06:28

## 2024-05-31 ASSESSMENT — PAIN DESCRIPTION - LOCATION
LOCATION: HEAD
LOCATION: MOUTH
LOCATION: MOUTH
LOCATION: TEETH

## 2024-05-31 ASSESSMENT — PAIN SCALES - GENERAL
PAINLEVEL_OUTOF10: 7
PAINLEVEL_OUTOF10: 7
PAINLEVEL_OUTOF10: 4
PAINLEVEL_OUTOF10: 7
PAINLEVEL_OUTOF10: 8
PAINLEVEL_OUTOF10: 8

## 2024-05-31 ASSESSMENT — PAIN DESCRIPTION - ORIENTATION
ORIENTATION: RIGHT
ORIENTATION: RIGHT

## 2024-05-31 ASSESSMENT — PAIN DESCRIPTION - DESCRIPTORS
DESCRIPTORS: SORE
DESCRIPTORS: SORE

## 2024-05-31 ASSESSMENT — PAIN DESCRIPTION - PAIN TYPE: TYPE: ACUTE PAIN

## 2024-05-31 NOTE — CARE COORDINATION
Case Management Assessment  Initial Evaluation    Date/Time of Evaluation: 5/31/2024 9:03 AM  Assessment Completed by: Ro Mclain RN    If patient is discharged prior to next notation, then this note serves as note for discharge by case management.    Patient Name: Amanda Estrada                   YOB: 1960  Diagnosis: Headache [R51.9]                   Date / Time: 5/30/2024 11:27 AM    Patient Admission Status: Inpatient   Readmission Risk (Low < 19, Mod (19-27), High > 27): Readmission Risk Score: 15.9    Current PCP: Albin Brandt MD  PCP verified by CM?      Chart Reviewed: Yes      History Provided by:    Patient Orientation:      Patient Cognition:      Hospitalization in the last 30 days (Readmission):  No    If yes, Readmission Assessment in  Navigator will be completed.    Advance Directives:      Code Status: Full Code   Patient's Primary Decision Maker is:        Discharge Planning:    Patient lives with:   Type of Home:    Primary Care Giver:    Patient Support Systems include:     Current Financial resources:    Current community resources:    Current services prior to admission:              Current DME:              Type of Home Care services:       ADLS  Prior functional level:    Current functional level:      PT AM-PAC:   /24  OT AM-PAC:   /24    Family can provide assistance at DC:    Would you like Case Management to discuss the discharge plan with any other family members/significant others, and if so, who?    Plans to Return to Present Housing:    Other Identified Issues/Barriers to RETURNING to current housing: None  Potential Assistance needed at discharge:              Potential DME:    Patient expects to discharge to:    Plan for transportation at discharge:      Financial    Payor: [facility] MEDICARE / Plan: [employer] DUAL COMPLETE / Product Type: *No Product type* /     Does insurance require precert for SNF: Yes    Potential assistance Purchasing

## 2024-05-31 NOTE — PROGRESS NOTES
[facility]   IN-PATIENT SERVICE   [facility]    HISTORY AND PHYSICAL EXAMINATION            Date:   5/31/2024  Patient name:  Amanda Estrada  Date of admission:  5/30/2024 11:27 AM  MRN:   [id number]  Account:  [id number]  YOB: 1960  PCP:    Albin Brandt MD  Room:   [address] [place] [address]  Code Status:    Full Code    Chief Complaint:     No chief complaint on file.      History Obtained From:     patient, electronic medical record    History of Present Illness:     The patient is a 63 y.o.  Non- / non  female who presents with No chief complaint on file.   and she is admitted to the hospital for the management of intractable headache  Patient unfortunately is a very poor historian  Patient has multiple medical problem, which include hypertension, SVT, CKD, migraine headache, cherry 1 malformation  Patient has a prolonged hospital stay at [facility], she presented with hypertensive urgency, patient also have intractable headache, was evaluated by neurologist underwent extensive testing which include MRI brain, MRA/MRV also had lumbar puncture, opening pressure was around 13, initially treated with Diamox for idiopathic intracranial hypertension which was later discontinued after lumbar puncture report  Patient also have ultrasound of carotid  Was also evaluated by nephrologist given history of CKD  Patient presented yesterday to [facility] with intractable headache, she was treated with migraine cocktail, patient had CT head which was negative  Patient denying complaints of abdominal pain, nausea, vomiting, chest pain  Her lipase was around 1300, CT abdomen pelvis results were seen, there is no inflammation of pancreas, pancreatic pseudocyst  Patient was treated valproic acid, Solu-Medrol during previous hospitalization for intractable headache        Past Medical History:     Past Medical History:   Diagnosis Date    Acute renal failure (ARF) (HCC)

## 2024-05-31 NOTE — PLAN OF CARE
Problem: Discharge Planning  Goal: Discharge to home or other facility with appropriate resources  Outcome: Progressing     Problem: Safety - Adult  Goal: Free from fall injury  Outcome: Progressing     Problem: Pain  Goal: Verbalizes/displays adequate comfort level or baseline comfort level  Outcome: Progressing  Flowsheets (Taken 5/31/2024 1957)  Verbalizes/displays adequate comfort level or baseline comfort level:   Encourage patient to monitor pain and request assistance   Assess pain using appropriate pain scale   Administer analgesics based on type and severity of pain and evaluate response   Implement non-pharmacological measures as appropriate and evaluate response   Consider cultural and social influences on pain and pain management   Notify Licensed Independent Practitioner if interventions unsuccessful or patient reports new pain     Problem: ABCDS Injury Assessment  Goal: Absence of physical injury  Outcome: Progressing

## 2024-05-31 NOTE — PROGRESS NOTES
Physician Progress Note      PATIENT:               MYLES REYNOLDS  [facility] #:                  [id number]  :                       1960  ADMIT DATE:       2024 11:27 AM  DISCH DATE:  RESPONDING  PROVIDER #:        Sravani Chase MD        QUERY TEXT:    Stage of Chronic Kidney Disease: Please provide further specificity, if known.    Clinical indicators include: bun, creatinine, ckd, chronic kidney disease  Options provided:  -- Chronic kidney disease stage 1  -- Chronic kidney disease stage 2  -- Chronic kidney disease stage 3  -- Chronic kidney disease stage 3a  -- Chronic kidney disease stage 3b  -- Chronic kidney disease stage 4  -- Chronic kidney disease stage 5  -- Chronic kidney disease stage 5, requiring dialysis  -- End stage renal disease  -- Other - I will add my own diagnosis  -- Disagree - Not applicable / Not valid  -- Disagree - Clinically Unable to determine / Unknown        PROVIDER RESPONSE TEXT:    The patient has chronic kidney disease stage 3a.      Electronically signed by:  Sravani Chase MD 2024 11:15 AM

## 2024-05-31 NOTE — PLAN OF CARE
Problem: Discharge Planning  Goal: Discharge to home or other facility with appropriate resources  5/31/2024 0313 by Nenita Lala RN  Outcome: Progressing  5/30/2024 1842 by Manan Max RN  Outcome: Progressing     Problem: Safety - Adult  Goal: Free from fall injury  5/31/2024 0313 by Nenita Lala RN  Outcome: Progressing  5/30/2024 1842 by Manan Max RN  Outcome: Progressing     Problem: Pain  Goal: Verbalizes/displays adequate comfort level or baseline comfort level  5/31/2024 0313 by Nenita Lala RN  Outcome: Progressing  5/30/2024 1842 by Manan Max RN  Outcome: Progressing     Problem: Skin/Tissue Integrity  Goal: Absence of new skin breakdown  Description: 1.  Monitor for areas of redness and/or skin breakdown  2.  Assess vascular access sites hourly  3.  Every 4-6 hours minimum:  Change oxygen saturation probe site  4.  Every 4-6 hours:  If on nasal continuous positive airway pressure, respiratory therapy assess nares and determine need for appliance change or resting period.  5/31/2024 0313 by Nenita Lala RN  Outcome: Progressing  5/30/2024 1842 by Manan Max RN  Outcome: Progressing     Problem: ABCDS Injury Assessment  Goal: Absence of physical injury  5/31/2024 0313 by Nenita Lala RN  Outcome: Progressing  Flowsheets (Taken 5/30/2024 2010)  Absence of Physical Injury: Implement safety measures based on patient assessment  5/30/2024 1842 by Manan Max RN  Outcome: Progressing  Flowsheets (Taken 5/30/2024 1159)  Absence of Physical Injury: Implement safety measures based on patient assessment

## 2024-05-31 NOTE — PROGRESS NOTES
NEUROLOGY INPATIENT PROGRESS NOTE     5/31/2024         Amanda Estrada is a  63 y.o. female admitted on 5/30/2024 with  Headache [R51.9]    Reason for consult: intractable headache  History is obtained mostly from the patient and the medical record and from the caregivers. Chart is reviewed and patient is examined.   Briefly, this is a  63 y.o. female admitted on 5/30/2024 with intractable headache.  She was discharged on 5/28/2024 on Topamax for headache prophylaxis and Fioricet prn for abortive therapy.  She comes back stating \" my head is hurting, not breathing right and my heart is racing\".   Vitals on admit 152/89, 98.4 °F.    Patient stated that she has been having headache located \"all over the head\" with the predominant severity located on left frontotemporal region without any radiation.  Admits to having photophobia but denied phonophobia.  Also admits to nausea but denied vomiting.  Presently headache is improving and patient appeared to be comfortable with the headache severity stating \"4-5/10\".  Patient was started on Topamax on 5/28/24 and she is tolerating it well without any adverse effects.  Patient denied history of kidney stones or glaucoma.  Patient also denied associated numbness and weakness with headaches.  5/31/2024: Chart reviewed and discussed with caregivers.  Patient had CTA neck as MRA was demonstrating questionable thrombus.  It did not show any evidence of thrombosis.  She also states that she did not sleep \"for past 2 days\" and she did not get her home dose of Ambien and she is requesting for it. She takes trazodone every night and Ambien as needed only and never had any adv effects from it and requesting for those meds.       No current facility-administered medications on file prior to encounter.     Current Outpatient Medications on File Prior to Encounter   Medication Sig Dispense Refill    aspirin 81 MG chewable tablet Take 1 tablet by mouth daily 30 tablet 3     neck 5/24/2024: There is an area of low signal in left ICA bulb raising concern for potential thrombus.    CTA neck 5/30/2024: Unremarkable; no evidence of thrombosis    MRV head 5/24/2024: Left venous system smaller in caliber but patent with no thrombosis.    Spinal tap with opening pressure 13  CSF 5/24/2024: RBC 0, WBC 0, glucose 64, protein 51             Impression and Plan: Ms. Amanda Estrada is a 63 y.o. female with   Intractable headache with features suggestive of migrainous headaches: Presently patient appears to be comfortable; to continue Topamax at 25 mg twice daily for prophylaxis along with Fioricet for abortive therapy.   Etiology for headaches: Upon further discussion; patient stated that she was getting Ambien at home and she was taking it on as-needed basis whenever trazodone does not help for sleep.  She stated that she did not sleep for past 2 nights.  Patient was initiated on Ambien 5 mg nightly prn.   Chiari I malformation without any signs of cranial nerve palsy or any other neurologic deficits.  MRA head with questionable thrombus; will get CTA neck in further eval.  Chest pain and respiratory distress: As per primary team.  Comorbid conditions include hypertension, CKD, history of SVT.    Care plan is discussed with the patient in detail and she voiced understanding those instructions  Will follow with you.      This note was partially created using voice recognition software and is inherently subject to errors including those of syntax and \"sound alike\" substitutions which may escape proofreading.  In such instances, original meaning may be extrapolated by contextual derivation.  Svetlana Fall MD 5/31/2024 10:21 AM

## 2024-06-01 VITALS
DIASTOLIC BLOOD PRESSURE: 76 MMHG | OXYGEN SATURATION: 100 % | SYSTOLIC BLOOD PRESSURE: 132 MMHG | WEIGHT: 277.34 LBS | TEMPERATURE: 99.1 F | RESPIRATION RATE: 15 BRPM | HEART RATE: 60 BPM | BODY MASS INDEX: 42.8 KG/M2

## 2024-06-01 PROBLEM — G47.00 INSOMNIA: Status: ACTIVE | Noted: 2024-06-01

## 2024-06-01 LAB
BASOPHILS # BLD: 0 K/UL (ref 0–0.2)
BASOPHILS NFR BLD: 0 % (ref 0–2)
EOSINOPHIL # BLD: 0 K/UL (ref 0–0.4)
EOSINOPHILS RELATIVE PERCENT: 0 % (ref 0–4)
ERYTHROCYTE [DISTWIDTH] IN BLOOD BY AUTOMATED COUNT: 16.9 % (ref 11.5–14.9)
HCT VFR BLD AUTO: 38.9 % (ref 36–46)
HGB BLD-MCNC: 12.3 G/DL (ref 12–16)
LYMPHOCYTES NFR BLD: 1.22 K/UL (ref 1–4.8)
LYMPHOCYTES RELATIVE PERCENT: 8 % (ref 24–44)
MCH RBC QN AUTO: 26.4 PG (ref 26–34)
MCHC RBC AUTO-ENTMCNC: 31.7 G/DL (ref 31–37)
MCV RBC AUTO: 83.6 FL (ref 80–100)
MONOCYTES NFR BLD: 0 % (ref 1–7)
MONOCYTES NFR BLD: 0 K/UL (ref 0.1–1.3)
MORPHOLOGY: ABNORMAL
MORPHOLOGY: ABNORMAL
NEUTROPHILS NFR BLD: 92 % (ref 36–66)
NEUTS SEG NFR BLD: 14.08 K/UL (ref 1.3–9.1)
PLATELET # BLD AUTO: 276 K/UL (ref 150–450)
PMV BLD AUTO: 9.3 FL (ref 6–12)
RBC # BLD AUTO: 4.66 M/UL (ref 4–5.2)
WBC OTHER # BLD: 15.3 K/UL (ref 3.5–11)

## 2024-06-01 PROCEDURE — 36415 COLL VENOUS BLD VENIPUNCTURE: CPT

## 2024-06-01 PROCEDURE — 6370000000 HC RX 637 (ALT 250 FOR IP): Performed by: PSYCHIATRY & NEUROLOGY

## 2024-06-01 PROCEDURE — 99239 HOSP IP/OBS DSCHRG MGMT >30: CPT | Performed by: INTERNAL MEDICINE

## 2024-06-01 PROCEDURE — 2580000003 HC RX 258: Performed by: INTERNAL MEDICINE

## 2024-06-01 PROCEDURE — 6370000000 HC RX 637 (ALT 250 FOR IP): Performed by: INTERNAL MEDICINE

## 2024-06-01 PROCEDURE — 6360000002 HC RX W HCPCS: Performed by: INTERNAL MEDICINE

## 2024-06-01 PROCEDURE — 85025 COMPLETE CBC W/AUTO DIFF WBC: CPT

## 2024-06-01 RX ORDER — BLOOD PRESSURE TEST KIT
KIT MISCELLANEOUS
Qty: 1 KIT | Refills: 0 | Status: SHIPPED | OUTPATIENT
Start: 2024-06-01

## 2024-06-01 RX ORDER — HYDRALAZINE HYDROCHLORIDE 50 MG/1
50 TABLET, FILM COATED ORAL EVERY 8 HOURS SCHEDULED
Status: DISCONTINUED | OUTPATIENT
Start: 2024-06-01 | End: 2024-06-01 | Stop reason: HOSPADM

## 2024-06-01 RX ORDER — HYDRALAZINE HYDROCHLORIDE 25 MG/1
25 TABLET, FILM COATED ORAL 3 TIMES DAILY
Qty: 90 TABLET | Refills: 0 | Status: SHIPPED | OUTPATIENT
Start: 2024-06-01 | End: 2024-07-01

## 2024-06-01 RX ADMIN — FOLIC ACID 1 MG: 1 TABLET ORAL at 08:22

## 2024-06-01 RX ADMIN — SODIUM CHLORIDE, PRESERVATIVE FREE 10 ML: 5 INJECTION INTRAVENOUS at 08:21

## 2024-06-01 RX ADMIN — ENOXAPARIN SODIUM 30 MG: 100 INJECTION SUBCUTANEOUS at 08:21

## 2024-06-01 RX ADMIN — METOPROLOL SUCCINATE 200 MG: 100 TABLET, EXTENDED RELEASE ORAL at 08:22

## 2024-06-01 RX ADMIN — AMLODIPINE BESYLATE 10 MG: 10 TABLET ORAL at 08:22

## 2024-06-01 RX ADMIN — LAMOTRIGINE 200 MG: 100 TABLET, EXTENDED RELEASE ORAL at 08:24

## 2024-06-01 RX ADMIN — HYDRALAZINE HYDROCHLORIDE 50 MG: 50 TABLET ORAL at 14:44

## 2024-06-01 RX ADMIN — ASPIRIN 81 MG 81 MG: 81 TABLET ORAL at 08:22

## 2024-06-01 RX ADMIN — OXYCODONE HYDROCHLORIDE 5 MG: 5 TABLET ORAL at 02:07

## 2024-06-01 RX ADMIN — CYANOCOBALAMIN TAB 1000 MCG 1000 MCG: 1000 TAB at 08:22

## 2024-06-01 RX ADMIN — TOPIRAMATE 25 MG: 25 TABLET, FILM COATED ORAL at 08:22

## 2024-06-01 RX ADMIN — HYDRALAZINE HYDROCHLORIDE 25 MG: 25 TABLET ORAL at 05:54

## 2024-06-01 RX ADMIN — WATER 40 MG: 1 INJECTION INTRAMUSCULAR; INTRAVENOUS; SUBCUTANEOUS at 02:07

## 2024-06-01 RX ADMIN — TRIAMTERENE AND HYDROCHLOROTHIAZIDE 1 TABLET: 37.5; 25 TABLET ORAL at 08:25

## 2024-06-01 NOTE — PLAN OF CARE
Problem: Discharge Planning  Goal: Discharge to home or other facility with appropriate resources  Outcome: Adequate for Discharge     Problem: Safety - Adult  Goal: Free from fall injury  Outcome: Adequate for Discharge     Problem: Pain  Goal: Verbalizes/displays adequate comfort level or baseline comfort level  Outcome: Adequate for Discharge     Problem: Skin/Tissue Integrity  Goal: Absence of new skin breakdown  Description: 1.  Monitor for areas of redness and/or skin breakdown  2.  Assess vascular access sites hourly  3.  Every 4-6 hours minimum:  Change oxygen saturation probe site  4.  Every 4-6 hours:  If on nasal continuous positive airway pressure, respiratory therapy assess nares and determine need for appliance change or resting period.  Outcome: Adequate for Discharge     Problem: ABCDS Injury Assessment  Goal: Absence of physical injury  Outcome: Adequate for Discharge

## 2024-06-01 NOTE — CARE COORDINATION
ONGOING DISCHARGE PLAN:    Patient is alert and oriented x4.    Spoke with patient regarding discharge plan and patient confirms that plan is still to return home with spouse, denies needs.    IMM letter provided to patient.  Patient offered four hours to make informed decision regarding appeal process; patient agreeable to discharge.     Will continue to follow for additional discharge needs.    If patient is discharged prior to next notation, then this note serves as note for discharge by case management.    Electronically signed by Meghan Hooks RN on 6/1/2024 at 10:07 AM

## 2024-06-01 NOTE — DISCHARGE INSTR - COC
Continuity of Care Form    Patient Name: Amanda Estrada   :  1960  MRN:  [id number]    Admit date:  2024  Discharge date:  ***    Code Status Order: Full Code   Advance Directives:     Admitting Physician:  Jose Carranza MD  PCP: Albin Brandt MD    Discharging Nurse: ***  Discharging Hospital Unit/Room#: 2103/2103-01  Discharging Unit Phone Number: ***    Emergency Contact:   Extended Emergency Contact Information  Primary Emergency Contact: VivianGood  Address: [address] [place] [address]  Home Phone: [phone]  Relation: Spouse    Past Surgical History:  Past Surgical History:   Procedure Laterality Date    ABDOMINAL SURGERY         Immunization History:   Immunization History   Administered Date(s) Administered    COVID-19, PFIZER PURPLE top, DILUTE for use, (age 12 y+), 30mcg/0.3mL 2021, 2021       Active Problems:  Patient Active Problem List   Diagnosis Code    Intractable headache R51.9    Hypertension I10    SVT (supraventricular tachycardia) (HCC) I47.10    Hypertensive emergency I16.1    Abnormal finding on MRI of brain R90.89    Noncompliance with medication regimen Z91.148    Elevated serum creatinine R79.89    Abnormal magnetic resonance angiography (MRA) of neck R93.89    Headache R51.9    Chiari I malformation (HCC) G93.5    Abnormal magnetic resonance angiography of head R93.0       Isolation/Infection:   Isolation            No Isolation          Patient Infection Status       None to display            Nurse Assessment:  Last Vital Signs: BP (!) 169/84   Pulse 57   Temp 98.7 °F (37.1 °C) (Oral)   Resp 16   Wt 125.8 kg (277 lb 5.4 oz)   SpO2 98%   BMI 42.80 kg/m²     Last documented pain score (0-10 scale): Pain Level: 8  Last Weight:   Wt Readings from Last 1 Encounters:   24 125.8 kg (277 lb 5.4 oz)     Mental Status:  {IP PT MENTAL STATUS:}    IV Access:  { AMPARO IV ACCESS:228188984}    Nursing    Address:  Phone:  Fax:    Dialysis Facility (if applicable)   Name:  Address:  Dialysis Schedule:  Phone:  Fax:    / signature: {Esignature:[id number]}    PHYSICIAN SECTION    Prognosis: {Prognosis:1987765093}    Condition at Discharge: { Patient Condition:578607011}    Rehab Potential (if transferring to Rehab): {Prognosis:3177236880}    Recommended Labs or Other Treatments After Discharge: ***    Physician Certification: I certify the above information and transfer of Amanda Estrada  is necessary for the continuing treatment of the diagnosis listed and that she requires {Admit to Appropriate Level of Care:61941} for {GREATER/LESS:762954302} 30 days.     Update Admission H&P: No change in H&P    PHYSICIAN SIGNATURE:  Electronically signed by Sravani Chase MD on 6/1/24 at 10:24 AM EDT

## 2024-06-01 NOTE — DISCHARGE SUMMARY
IN-PATIENT SERVICE   [facility] Internal Medicine    Discharge Summary     Patient ID: Amanda Estrada  :  1960   MRN: [id number]     ACCOUNT:  [id number]   Patient's PCP: Albin Brandt MD  Admit Date: 2024   Discharge Date: 2024    Length of Stay: 2  Code Status:  Full Code  Admitting Physician: Jose Carranza MD  Discharge Physician: Sravani Chase MD     Active Discharge Diagnoses:     Primary Problem  Headache      Hospital Problems  Active Hospital Problems    Diagnosis Date Noted    Headache [R51.9] 2024    Chiari I malformation (HCC) [G93.5] 2024    Abnormal magnetic resonance angiography of head [R93.0] 2024       Admission Condition:  fair     Discharged Condition: fair    Hospital Stay:     Hospital Course:  Amanda Estrada is a 63 y.o. female who was admitted for the management of Headache , presented to ER with No chief complaint on file.    Patient has multiple medical problem, which include hypertension, SVT, CKD, migraine headache, cherry 1 malformation  Patient has a prolonged hospital stay at [facility], she presented with hypertensive urgency, patient also have intractable headache, was evaluated by neurologist underwent extensive testing which include MRI brain, MRA/MRV also had lumbar puncture, opening pressure was around 13, initially treated with Diamox for idiopathic intracranial hypertension which was later discontinued after lumbar puncture report  Patient also have ultrasound of carotid  Was also evaluated by nephrologist given history of CKD  Patient presented yesterday to [facility] with intractable headache, she was treated with migraine cocktail, patient had CT head which was negative  Patient denying complaints of abdominal pain, nausea, vomiting, chest pain  Her lipase was around 1300, CT abdomen pelvis results were seen, there is no inflammation of pancreas, pancreatic pseudocyst  Patient was treated  Kidneys demonstrate increased cortical echogenicity.  No hydronephrosis or intrarenal stones.  There is a 3.6 cm cyst within the left kidney.     1. Increased cortical echogenicity of the kidneys which can be seen with medical renal disease. 2. Left renal cyst.     IR LUMBAR PUNCTURE FOR DIAGNOSIS    Result Date: 5/24/2024  EXAMINATION: FLUOROSCOPIC GUIDED LUMBAR PUNCTURE 5/24/2024 11:03 am HISTORY: ORDERING SYSTEM PROVIDED HISTORY: intractable headache, concern for IIH TECHNOLOGIST PROVIDED HISTORY: intractable headache, concern for IIH please measure opening and closing pressure. If OP>20, please remove fluid to decrease <20. THank you. FLUOROSCOPY DOSE AND TYPE: Radiation Exposure Index: Kerma mGy, 12.4 PROCEDURE: :  Emily Rodriguez MD Informed consent was obtained after the risks and benefits of the procedure were discussed with the patient and all questions were answered fully. [place] protocol was observed and a standard timeout was performed. The patient was placed prone on the fluoroscopic table. The lower back was prepped and draped in usual sterile fashion. 1% lidocaine was used as local anesthetic. Under fluoroscopic guidance a 20-gauge spinal needle was advanced to the thecal sac at the L2-L3 level. Upon establishing CSF fluid return, the measured opening pressure was 13 cm of water which is within normal limits (normal opening pressure is less than 18-20 cm of water). Multiple vials of clear CSF were obtained for a total of 8 mm.  The samples were labeled appropriately. The stylet was reinserted, spinal needle was removed and brief pressure was applied at the puncture site. There were no immediate complications and the patient tolerated the procedure well.     Successful fluoroscopic-guided lumbar puncture.  Opening pressure was 13 cm water.         Consultations:    Consults:     Final Specialist Recommendations/Findings:   IP CONSULT TO NEUROLOGY      The patient was seen and examined

## 2024-06-06 LAB
EKG ATRIAL RATE: 57 BPM
EKG P AXIS: 20 DEGREES
EKG P-R INTERVAL: 160 MS
EKG Q-T INTERVAL: 426 MS
EKG QRS DURATION: 106 MS
EKG QTC CALCULATION (BAZETT): 414 MS
EKG R AXIS: -28 DEGREES
EKG T AXIS: 6 DEGREES
EKG VENTRICULAR RATE: 57 BPM

## 2025-01-17 RX ORDER — HYDRALAZINE HYDROCHLORIDE 25 MG/1
25 TABLET, FILM COATED ORAL 3 TIMES DAILY
Qty: 90 TABLET | Refills: 0 | OUTPATIENT
Start: 2025-01-17